# Patient Record
Sex: FEMALE | Race: WHITE | NOT HISPANIC OR LATINO | Employment: OTHER | URBAN - NONMETROPOLITAN AREA
[De-identification: names, ages, dates, MRNs, and addresses within clinical notes are randomized per-mention and may not be internally consistent; named-entity substitution may affect disease eponyms.]

---

## 2017-01-25 ENCOUNTER — OFFICE VISIT (OUTPATIENT)
Dept: CARDIOLOGY | Facility: CLINIC | Age: 62
End: 2017-01-25

## 2017-01-25 VITALS
DIASTOLIC BLOOD PRESSURE: 80 MMHG | HEIGHT: 64 IN | WEIGHT: 260 LBS | SYSTOLIC BLOOD PRESSURE: 112 MMHG | BODY MASS INDEX: 44.39 KG/M2

## 2017-01-25 DIAGNOSIS — R06.09 DYSPNEA ON EXERTION: ICD-10-CM

## 2017-01-25 DIAGNOSIS — I10 ESSENTIAL HYPERTENSION: ICD-10-CM

## 2017-01-25 DIAGNOSIS — E78.2 MIXED HYPERLIPIDEMIA: ICD-10-CM

## 2017-01-25 DIAGNOSIS — R60.0 BILATERAL EDEMA OF LOWER EXTREMITY: ICD-10-CM

## 2017-01-25 DIAGNOSIS — I25.10 CORONARY ARTERY DISEASE INVOLVING NATIVE CORONARY ARTERY OF NATIVE HEART WITHOUT ANGINA PECTORIS: Primary | ICD-10-CM

## 2017-01-25 PROCEDURE — 99214 OFFICE O/P EST MOD 30 MIN: CPT | Performed by: INTERNAL MEDICINE

## 2017-01-25 PROCEDURE — 93000 ELECTROCARDIOGRAM COMPLETE: CPT | Performed by: INTERNAL MEDICINE

## 2017-01-25 RX ORDER — CLONIDINE HYDROCHLORIDE 0.1 MG/1
0.1 TABLET ORAL 2 TIMES DAILY
COMMUNITY
End: 2017-07-14 | Stop reason: SDUPTHER

## 2017-01-25 RX ORDER — OLMESARTAN MEDOXOMIL 20 MG/1
40 TABLET ORAL DAILY
Qty: 30 TABLET | Refills: 6
Start: 2017-01-25 | End: 2022-03-18 | Stop reason: DRUGHIGH

## 2017-01-25 RX ORDER — FUROSEMIDE 20 MG/1
20 TABLET ORAL DAILY
Qty: 90 TABLET | Refills: 3 | Status: SHIPPED | OUTPATIENT
Start: 2017-01-25 | End: 2018-02-05 | Stop reason: SDUPTHER

## 2017-01-25 NOTE — PROGRESS NOTES
Aleksandra Joseph  61 y.o. female    01/25/2017  1. Coronary artery disease involving native coronary artery of native heart without angina pectoris    2. Essential hypertension    3. Mixed hyperlipidemia    4. Dyspnea on exertion    5. Bilateral edema of lower extremity        History of Present Illness    Mrs. Joseph is here for follow-up of her multiple issues.  Her predominant complaint is swelling in both lower extremities which has been present for the past couple weeks for which she she was evaluated by Dr. Rueda.  I understand that her HCTZ was stopped and she was started on Lasix 20 mg daily along with the spironolactone 25 mg daily and this seems to have helped and she has lost about 13 pounds in weight during the past week.  She does have NYHA class II dyspnea on exertion with no PND or orthopnea.  She did have one episode of chest pressure a few weeks prior and this seems to have resolved.  EKG today confirms sinus rhythm with ectopic atrial rhythm with no ST-T wave changes diagnostic of ischemia.  No signs of congestive heart failure were noted.  Her blood pressure was in the normal range.  She does have 1-2+ pitting edema in both lower extremities.  She denied any excessive salt intake and has been very compliant with her medications.        SUBJECTIVE    Allergies   Allergen Reactions   • Adhesive Tape      bandaids   • Naproxen      FACIAL SWELLING   • Tekturna [Aliskiren]      EXTREME EYE SWELLING   • Penicillins Rash         Past Medical History   Diagnosis Date   • Allergic conjunctivitis    • Borderline glaucoma    • Carpal tunnel syndrome    • Diverticulitis of colon    • Drusen of optic disc    • Hemorrhoids    • Hyperlipidemia    • Hypertension    • Ingrowing nail    • Vitreous detachment of right eye          Past Surgical History   Procedure Laterality Date   • Ankle surgery Right 08/14/1971     ORIF of trimalleolar fracture, right ankle   • Cardiac catheterization  08/06/1991     chest  pain etiology undetermined. Hypertension   • Carpal tunnel release  02/16/2012     Endoscopic carpal tunnel release. left wrist   • Cholecystectomy  09/26/2001     biliary colic   • Colonoscopy  11/25/1997     Mild diverticulosis of sigmoid colo w/ no evidence of diverticulitis. Internal hemorrhoidsHematochezia suspected related to internal hemorrhoids   • Dilatation and curettage  01/06/1984     dysfunctional uterine bleeding   • Nail bed removal/revision  01/18/1996     Negative for exercise induced ischemia at a level of 87% of the maximum predicted HR. Abnormal blood pressure response to exercise. Functional Class I   • Hand surgery  12/18/1971     excision of ganglion cyst, right palm.   • Knee arthroscopy  12/11/2012     Arthroscopy, medial & lateral meniscectomy, partial posteriorly w/ chondroplasty of the patella. Torn medial meniscus right knee   • Pap smear  05/02/1995     NORMAL   • Excision lesion  07/18/2002     Shave excision, right superior earlobe. Intradermal nevus, symptomatic, right superior earlobe   • Hysterectomy  11/20/1992     laparoscopically assisted vaginal hysterectomy & RSO. Pelvic pain, dyspareunia, menorrhagia, dysmenorrhea. Same plus? endometriosis of right ovary   • Coronary stent placement           Family History   Problem Relation Age of Onset   • Breast cancer Other    • Colon cancer Other    • Diabetes Other    • Heart disease Other    • Hypertension Other    • Thyroid disease Other          Social History     Social History   • Marital status:      Spouse name: N/A   • Number of children: N/A   • Years of education: N/A     Occupational History   • Not on file.     Social History Main Topics   • Smoking status: Never Smoker   • Smokeless tobacco: Never Used   • Alcohol use No   • Drug use: No   • Sexual activity: Defer     Other Topics Concern   • Not on file     Social History Narrative         Current Outpatient Prescriptions   Medication Sig Dispense Refill   •  aspirin 81 MG EC tablet Take 81 mg by mouth Daily.     • Bioflavonoid Products (VITAMIN C) chewable tablet Chew.     • Calcium Carbonate-Vitamin D (CALCIUM 500 + D PO) Take  by mouth.     • clonazePAM (KlonoPIN) 1 MG tablet Take 1 mg by mouth At Night As Needed for seizures.     • CloNIDine (CATAPRES) 0.1 MG tablet Take 0.1 mg by mouth 2 (Two) Times a Day.     • clopidogrel (PLAVIX) 75 MG tablet Take 75 mg by mouth Daily.     • Coenzyme Q10 (CO Q-10) 400 MG capsule Take  by mouth.     • dicyclomine (BENTYL) 10 MG capsule Take 10 mg by mouth As Needed.     • estrogens, conjugated, (PREMARIN) 0.625 MG tablet Take 0.625 mg by mouth Daily. Take daily for 21 days then do not take for 7 days.     • Glucosamine-Chondroitin (OSTEO BI-FLEX REGULAR STRENGTH PO) Take  by mouth.     • isosorbide dinitrate (ISORDIL) 30 MG tablet Take 30 mg by mouth 4 (Four) Times a Day.     • LABETALOL HCL PO Take 200 tablets by mouth 2 (Two) Times a Day.     • lansoprazole (PREVACID) 30 MG capsule Take 30 mg by mouth Daily.     • levothyroxine (SYNTHROID, LEVOTHROID) 100 MCG tablet Take 100 mcg by mouth Daily.     • minocycline (MINOCIN,DYNACIN) 100 MG capsule Take 100 mg by mouth Daily.     • Multiple Vitamins-Minerals (MULTIVITAMIN ADULT PO) Take  by mouth.     • nitroglycerin (NITROSTAT) 0.4 MG SL tablet 1 under the tongue as needed for angina, may repeat q5mins for up three doses 30 tablet 3   • olmesartan-hydrochlorothiazide (BENICAR HCT) 40-12.5 MG per tablet Take 1 tablet by mouth Daily. 90 tablet 3   • Omega-3 Fatty Acids (FISH OIL) 1000 MG capsule capsule Take  by mouth 2 (Two) Times a Day With Meals.     • rosuvastatin (CRESTOR) 10 MG tablet Take 10 mg by mouth Daily.     • spironolactone (ALDACTONE) 25 MG tablet Take 25 mg by mouth Daily.     • Sucralfate (CARAFATE PO) Take  by mouth.     • vitamin C (ASCORBIC ACID) 500 MG tablet Take 500 mg by mouth Daily.       No current facility-administered medications for this visit.   "        OBJECTIVE    Visit Vitals   • /80   • Ht 64\" (162.6 cm)   • Wt 260 lb (118 kg)   • BMI 44.63 kg/m2           Review of Systems     Constitutional:  Denies recent weight loss, weight gain, fever or chills, no change in exercise tolerance     HENT:  Denies any hearing loss, epistaxis, hoarseness, or difficulty speaking.     Eyes: Wears eyeglasses or contact lenses     Respiratory:  Dyspnea with exertion, no cough, wheezing, or hemoptysis.     Cardiovascular: Negative for palpations, syncope, or claudication.     Gastrointestinal:  Denies change in bowel habits, dyspepsia, ulcer disease, hematochezia, or melena.     Endocrine: Negative for cold intolerance, heat intolerance, polydipsia, polyphagia and polyuria. Denies any history of weight change, heat/cold intolerance, polydipsia, polyuria     Genitourinary: Negative.      Musculoskeletal: Denies any history of arthritic symptoms or back problems     Skin:  Denies any change in hair or nails, rashes, or skin lesions.     Allergic/Immunologic: Negative.  Negative for environmental allergies, food allergies and immunocompromised state.     Neurological:  Denies any history of recurrent headaches, strokes, TIA, or seizure disorder.     Hematological: Denies any food allergies, seasonal allergies, bleeding disorders, or lymphadenopathy.     Psychiatric/Behavioral: Denies any history of depression, substance abuse, or change in cognitive function.         Physical Exam     Constitutional: Cooperative, alert and oriented, well-developed, well-nourished, in no acute distress.     HENT:   Head: Normocephalic, normal hair patterns, no masses or tenderness.  Ears, Nose, and Throat: No gross abnormalities. No pallor or cyanosis. Dentition good.   Eyes: EOMS intact, PERRL, conjunctivae and lids unremarkable. Fundoscopic exam and visual fields not performed.   Neck: No palpable masses or adenopathy, no thyromegaly, no JVD, carotid pulses are full and equal " bilaterally and without  Bruits.     Cardiovascular: Regular rhythm, S1 and S2 normal, no S3 or S4. Apical impulse not displaced. No murmurs, gallops, or rubs detected.     Pulmonary/Chest: Chest: normal symmetry, no tenderness to palpation, normal respiratory excursion, no intercostal retraction, no use of accessory muscles.            Pulmonary: Normal breath sounds. No rales or ronchi.    Abdominal: Abdomen soft, bowel sounds normoactive, no masses, no hepatosplenomegaly, non-tender, no bruits.     Musculoskeletal: No deformities, clubbing, cyanosis, erythema, 1-2+ edema observed in lower extremities.There are no spinal abnormalities noted. Normal muscle strength and tone. Pulses full and equal in all extremities, no bruits auscultated.     Neurological: No gross motor or sensory deficits noted, affect appropriate, oriented to time, person, place.     Skin: Warm and dry to the touch, no apparent skin lesions or masses noted.     Psychiatric: She has a normal mood and affect. Her behavior is normal. Judgment and thought content normal.           ECG 12 Lead  Date/Time: 1/25/2017 12:36 PM  Performed by: VIRI RAMIREZ  Authorized by: VIRI RAMIREZ   Comparison: not compared with previous ECG   Rate: normal  QRS axis: normal  Comments: EKG showed ectopic atrial rhythm heart rate of 66 bpm.  Nonspecific ST-T changes were noted.              No results found for: WBC, HGB, HCT, MCV, PLT  No results found for: GLUCOSE, BUN, CREATININE, EGFRIFNONA, EGFRIFAFRI, BCR, CO2, CALCIUM, PROTENTOTREF, ALBUMIN, LABIL2, AST, ALT  No results found for: CHOL  No results found for: TRIG  No results found for: HDL  No results found for: LDLCALC  No results found for: LDL  No results found for: HDLLDLRATIO  No components found for: CHOLHDL  No results found for: HGBA1C  No results found for: TSH, W4ZFZZC, S1PQRDU, THYROIDAB        ASSESSMENT AND PLAN    Ms. Joseph has presented with dyspnea and edema which is  clearly multifactorial.  No signs of congestive heart failure were noted.  The suspicion for ongoing ischemia is low.  I agree with continuing Lasix and Aldactone at the present time and antihypertensive therapy with the labetalol, Benicar and clonidine may be continued.  An echocardiogram to assess left ventricular and valvular function and right heart pressures is being arranged.  Antiplatelet therapy with aspirin and Plavix have been continued.  I suspect that venous stasis and side effects of medication is contributing to it edema.  Aleksandra was seen today for leg swelling.    Diagnoses and all orders for this visit:    Coronary artery disease involving native coronary artery of native heart without angina pectoris  -     Adult Transthoracic Echo Complete; Future    Essential hypertension  -     Adult Transthoracic Echo Complete; Future    Mixed hyperlipidemia  -     Adult Transthoracic Echo Complete; Future    Dyspnea on exertion  -     Adult Transthoracic Echo Complete; Future    Bilateral edema of lower extremity  -     Adult Transthoracic Echo Complete; Future        Joshua Ernandez MD  1/25/2017  12:30 PM

## 2017-01-25 NOTE — MR AVS SNAPSHOT
Aleksandra Joseph   1/25/2017 12:00 PM   Office Visit    Dept Phone:  [phone]   Encounter #:  [id number]    Provider:  Joshua Ernandez MD   Department:  [facility] CARDIOLOGY                Your Full Care Plan              Today's Medication Changes          These changes are accurate as of: 1/25/17 12:34 PM.  If you have any questions, ask your nurse or doctor.               Stop taking medication(s)listed here:     latanoprost 0.005 % ophthalmic solution   Commonly known as:  XALATAN   Stopped by:  Joshua Ernandez MD                      Your Updated Medication List          This list is accurate as of: 1/25/17 12:34 PM.  Always use your most recent med list.                aspirin 81 MG EC tablet       CALCIUM 500 + D PO       CARAFATE PO       clonazePAM 1 MG tablet   Commonly known as:  KlonoPIN       CloNIDine 0.1 MG tablet   Commonly known as:  CATAPRES       clopidogrel 75 MG tablet   Commonly known as:  PLAVIX       Co Q-10 400 MG capsule       dicyclomine 10 MG capsule   Commonly known as:  BENTYL       estrogens (conjugated) 0.625 MG tablet   Commonly known as:  PREMARIN       fish oil 1000 MG capsule capsule       isosorbide dinitrate 30 MG tablet   Commonly known as:  ISORDIL       LABETALOL HCL PO       lansoprazole 30 MG capsule   Commonly known as:  PREVACID       levothyroxine 100 MCG tablet   Commonly known as:  SYNTHROID, LEVOTHROID       minocycline 100 MG capsule   Commonly known as:  MINOCIN,DYNACIN       MULTIVITAMIN ADULT PO       nitroglycerin 0.4 MG SL tablet   Commonly known as:  NITROSTAT   1 under the tongue as needed for angina, may repeat q5mins for up three doses       olmesartan-hydrochlorothiazide 40-12.5 MG per tablet   Commonly known as:  BENICAR HCT   Take 1 tablet by mouth Daily.       OSTEO BI-FLEX REGULAR STRENGTH PO       rosuvastatin 10 MG tablet   Commonly known as:  CRESTOR       spironolactone 25 MG  tablet   Commonly known as:  ALDACTONE       vitamin C 500 MG tablet   Commonly known as:  ASCORBIC ACID       Vitamin C chewable tablet               You Were Diagnosed With        Codes Comments    Coronary artery disease involving native coronary artery of native heart without angina pectoris    -  Primary ICD-10-CM: I25.10  ICD-9-CM: 414.01     Essential hypertension     ICD-10-CM: I10  ICD-9-CM: 401.9     Mixed hyperlipidemia     ICD-10-CM: E78.2  ICD-9-CM: 272.2     Dyspnea on exertion     ICD-10-CM: R06.09  ICD-9-CM: 786.09     Bilateral edema of lower extremity     ICD-10-CM: R60.0  ICD-9-CM: 782.3       Instructions     None    Patient Instructions History      Upcoming Appointments     Visit Type Date Time Department    OFFICE VISIT 1/25/2017 12:00 PM [facility] HEART [facility] ECHO 2D COMPLETE [place] 2/1/2017 11:30 AM [facility] HEART [facility]    OFFICE VISIT 5/4/2017  1:15 PM [facility]    VISUAL FIELD 6/21/2017  1:00 PM [facility] OPHTHALMOLOGY [facility]    OV WITH VISUAL FIELD 6/21/2017  1:30 PM [facility] OPHTHALMOLOGY [facility]    OFFICE VISIT 7/25/2017 11:30 AM [facility]      MyChart Signup     Our records indicate that you have declined [facility] sign[facility]. If you would like to sign up for [facility], please email [email or web address] or call [phone] to obtain an activation code.             Other Info from Your Visit           Your Appointments     Feb 01, 2017 11:30 AM CST   [facility]ARD[facility] CARDIOLOGY (--)    [address]  [facility] [address] [phone]           Bring your insurance cards with you. Wear comfortable clothing and shoes.            May 04, 2017  1:15 PM CDT   Office Visit with Joshua Ernandez MD   [facility] CARDIOLOGY (--)    [address]  [facility] [address] [phone]           Arrive 15 minutes prior to appointment.          "   Jun 21, 2017  1:00 PM CDT   Visual Field with [facility] OPHTHALMOLOGY [facility] OPHTHALMOLOGY (--)    [address] [place] [address] [address]  [address] [facility] [address] [phone]            Jun 21, 2017  1:30 PM CDT   office visit with visual fields with Jacinto Mckee MD   [facility] OPHTHALMOLOGY (--)    [address] [place] [address] [address]  [address] [place] [address] [phone]            Jul 25, 2017 11:30 AM CDT   Office Visit with Joshua Ernandez MD   [facility] CARDIOLOGY (--)    [address] [facility] [address]  [facility] [address] [phone]           Arrive 15 minutes prior to appointment.              Allergies     Adhesive Tape      bandaids    Naproxen      FACIAL SWELLING    Tekturna [Aliskiren]      EXTREME EYE SWELLING    Penicillins  Rash      Reason for Visit     Leg Swelling           Vital Signs     Blood Pressure Height Weight Body Mass Index Smoking Status       112/80 64\" (162.6 cm) 260 lb (118 kg) 44.63 kg/m2 Never Smoker       Problems and Diagnoses Noted     Coronary artery disease involving native coronary artery of native heart without angina pectoris    Breathlessness on exertion    Fluid retention in legs    High blood pressure    Mixed hyperlipidemia        "

## 2017-02-07 ENCOUNTER — DOCUMENTATION (OUTPATIENT)
Dept: CARDIOLOGY | Facility: CLINIC | Age: 62
End: 2017-02-07

## 2017-02-20 RX ORDER — SPIRONOLACTONE 25 MG/1
25 TABLET ORAL DAILY
Qty: 90 TABLET | Refills: 2 | Status: SHIPPED | OUTPATIENT
Start: 2017-02-20 | End: 2017-11-20 | Stop reason: SDUPTHER

## 2017-02-20 NOTE — TELEPHONE ENCOUNTER
Refill Request came in by fax from   [facility]/pharmacy #[id number] - [place], [place] - [address] [place] [address] - [phone]  - [phone] [address] [address]  Phone: [phone] Fax: [phone]   Refill sent to pharmacy via e-scribe.

## 2017-02-22 DIAGNOSIS — H40.10X0 OPEN-ANGLE GLAUCOMA OF BOTH EYES, UNSPECIFIED GLAUCOMA STAGE, UNSPECIFIED OPEN-ANGLE GLAUCOMA TYPE: Primary | ICD-10-CM

## 2017-02-22 RX ORDER — LATANOPROST 50 UG/ML
1 SOLUTION/ DROPS OPHTHALMIC NIGHTLY
Qty: 7.5 ML | Refills: 3 | Status: SHIPPED | OUTPATIENT
Start: 2017-02-22 | End: 2017-03-08 | Stop reason: SDUPTHER

## 2017-03-08 DIAGNOSIS — H40.10X0 OPEN-ANGLE GLAUCOMA OF BOTH EYES, UNSPECIFIED GLAUCOMA STAGE, UNSPECIFIED OPEN-ANGLE GLAUCOMA TYPE: ICD-10-CM

## 2017-03-08 RX ORDER — LATANOPROST 50 UG/ML
1 SOLUTION/ DROPS OPHTHALMIC NIGHTLY
Qty: 7.5 ML | Refills: 3 | Status: SHIPPED | OUTPATIENT
Start: 2017-03-08

## 2017-04-17 RX ORDER — ROSUVASTATIN CALCIUM 10 MG/1
10 TABLET, COATED ORAL DAILY
Qty: 90 TABLET | Refills: 2 | Status: SHIPPED | OUTPATIENT
Start: 2017-04-17 | End: 2018-01-03 | Stop reason: SDUPTHER

## 2017-04-17 NOTE — TELEPHONE ENCOUNTER
Refill Request came in by fax from   [facility]/pharmacy #[id number] - [place], [place] - [address] [place] [address] - [phone]  - [phone] [address] [address]  Phone: [phone] Fax: [phone]   Refill sent to pharmacy via e-scribe.

## 2017-07-14 RX ORDER — CLONIDINE HYDROCHLORIDE 0.1 MG/1
0.1 TABLET ORAL 2 TIMES DAILY
Qty: 180 TABLET | Refills: 2 | Status: SHIPPED | OUTPATIENT
Start: 2017-07-14 | End: 2018-04-17 | Stop reason: SDUPTHER

## 2017-07-14 RX ORDER — ISOSORBIDE MONONITRATE 30 MG/1
30 TABLET, EXTENDED RELEASE ORAL DAILY
COMMUNITY
End: 2017-07-17 | Stop reason: SDUPTHER

## 2017-07-14 RX ORDER — LABETALOL 200 MG/1
200 TABLET, FILM COATED ORAL 2 TIMES DAILY
Qty: 180 TABLET | Refills: 2 | Status: SHIPPED | OUTPATIENT
Start: 2017-07-14 | End: 2018-04-02 | Stop reason: SDUPTHER

## 2017-07-14 NOTE — TELEPHONE ENCOUNTER
Refill Request came in by fax from   [facility]/pharmacy #[id number] - [place], [place] - [address] [place] [address] - [phone]  - [phone] [address] [address]  Phone: [phone] Fax: [phone]   Refill sent to pharmacy via e-scribe.

## 2017-07-17 RX ORDER — ISOSORBIDE MONONITRATE 30 MG/1
30 TABLET, EXTENDED RELEASE ORAL DAILY
Qty: 90 TABLET | Refills: 1 | Status: SHIPPED | OUTPATIENT
Start: 2017-07-17 | End: 2018-01-03 | Stop reason: SDUPTHER

## 2017-08-01 ENCOUNTER — OFFICE VISIT (OUTPATIENT)
Dept: CARDIOLOGY | Facility: CLINIC | Age: 62
End: 2017-08-01

## 2017-08-01 VITALS
SYSTOLIC BLOOD PRESSURE: 110 MMHG | DIASTOLIC BLOOD PRESSURE: 75 MMHG | BODY MASS INDEX: 42.51 KG/M2 | HEIGHT: 64 IN | HEART RATE: 78 BPM | WEIGHT: 249 LBS

## 2017-08-01 DIAGNOSIS — E78.2 MIXED HYPERLIPIDEMIA: ICD-10-CM

## 2017-08-01 DIAGNOSIS — I10 ESSENTIAL HYPERTENSION: ICD-10-CM

## 2017-08-01 DIAGNOSIS — I25.10 CORONARY ARTERY DISEASE INVOLVING NATIVE CORONARY ARTERY OF NATIVE HEART WITHOUT ANGINA PECTORIS: Primary | ICD-10-CM

## 2017-08-01 DIAGNOSIS — R06.09 DYSPNEA ON EXERTION: ICD-10-CM

## 2017-08-01 PROCEDURE — 99213 OFFICE O/P EST LOW 20 MIN: CPT | Performed by: INTERNAL MEDICINE

## 2017-08-01 RX ORDER — ESTRADIOL 1 MG/1
1 TABLET ORAL DAILY
COMMUNITY
End: 2018-08-23 | Stop reason: SDUPTHER

## 2017-08-01 NOTE — PROGRESS NOTES
Aleksandra Joseph  62 y.o. female    08/01/2017  1. Coronary artery disease involving native coronary artery of native heart without angina pectoris    2. Essential hypertension    3. Mixed hyperlipidemia    4. Dyspnea on exertion        History of Present Illness    Mrs. Joseph is here for follow-up of her above stated problems.  She denied any chest pain, shortness of breath, palpitation, dizziness or syncope.  She's been compliant with her medications.  Her blood pressure was in the normal range.  She is coming up for a physical by Dr. Rueda and blood work will be performed at that time.  Her lipid profiles within the normal range in October last year.        SUBJECTIVE    Allergies   Allergen Reactions   • Adhesive Tape      bandaids   • Naproxen      FACIAL SWELLING   • Tekturna [Aliskiren]      EXTREME EYE SWELLING   • Penicillins Rash         Past Medical History:   Diagnosis Date   • Allergic conjunctivitis    • Borderline glaucoma    • Carpal tunnel syndrome    • Diverticulitis of colon    • Drusen of optic disc    • Hemorrhoids    • Hyperlipidemia    • Hypertension    • Ingrowing nail    • Vitreous detachment of right eye          Past Surgical History:   Procedure Laterality Date   • ANKLE SURGERY Right 08/14/1971    ORIF of trimalleolar fracture, right ankle   • CARDIAC CATHETERIZATION  08/06/1991    chest pain etiology undetermined. Hypertension   • CARPAL TUNNEL RELEASE  02/16/2012    Endoscopic carpal tunnel release. left wrist   • CHOLECYSTECTOMY  09/26/2001    biliary colic   • COLONOSCOPY  11/25/1997    Mild diverticulosis of sigmoid colo w/ no evidence of diverticulitis. Internal hemorrhoidsHematochezia suspected related to internal hemorrhoids   • CORONARY STENT PLACEMENT     • DILATATION AND CURETTAGE  01/06/1984    dysfunctional uterine bleeding   • EXCISION LESION  07/18/2002    Shave excision, right superior earlobe. Intradermal nevus, symptomatic, right superior earlobe   • HAND SURGERY   12/18/1971    excision of ganglion cyst, right palm.   • HYSTERECTOMY  11/20/1992    laparoscopically assisted vaginal hysterectomy & RSO. Pelvic pain, dyspareunia, menorrhagia, dysmenorrhea. Same plus? endometriosis of right ovary   • KNEE ARTHROSCOPY  12/11/2012    Arthroscopy, medial & lateral meniscectomy, partial posteriorly w/ chondroplasty of the patella. Torn medial meniscus right knee   • NAIL BED REMOVAL/REVISION  01/18/1996    Negative for exercise induced ischemia at a level of 87% of the maximum predicted HR. Abnormal blood pressure response to exercise. Functional Class I   • PAP SMEAR  05/02/1995    NORMAL   • REPLACEMENT TOTAL KNEE Left          Family History   Problem Relation Age of Onset   • Breast cancer Other    • Colon cancer Other    • Diabetes Other    • Heart disease Other    • Hypertension Other    • Thyroid disease Other          Social History     Social History   • Marital status:      Spouse name: N/A   • Number of children: N/A   • Years of education: N/A     Occupational History   • Not on file.     Social History Main Topics   • Smoking status: Never Smoker   • Smokeless tobacco: Never Used   • Alcohol use No   • Drug use: No   • Sexual activity: Defer     Other Topics Concern   • Not on file     Social History Narrative         Current Outpatient Prescriptions   Medication Sig Dispense Refill   • aspirin 81 MG EC tablet Take 81 mg by mouth Daily.     • Calcium Carbonate-Vitamin D (CALCIUM 500 + D PO) Take  by mouth.     • clonazePAM (KlonoPIN) 1 MG tablet Take 1 mg by mouth At Night As Needed for seizures.     • CloNIDine (CATAPRES) 0.1 MG tablet Take 1 tablet by mouth 2 (Two) Times a Day. 180 tablet 2   • clopidogrel (PLAVIX) 75 MG tablet Take 75 mg by mouth Daily.     • Coenzyme Q10 (CO Q-10) 400 MG capsule Take  by mouth.     • dicyclomine (BENTYL) 10 MG capsule Take 10 mg by mouth As Needed.     • estradiol (ESTRACE) 1 MG tablet Take 1 mg by mouth Daily.     •  "furosemide (LASIX) 20 MG tablet Take 1 tablet by mouth Daily. 90 tablet 3   • Glucosamine-Chondroitin (OSTEO BI-FLEX REGULAR STRENGTH PO) Take  by mouth.     • isosorbide mononitrate (IMDUR) 30 MG 24 hr tablet Take 1 tablet by mouth Daily. 90 tablet 1   • labetalol (NORMODYNE) 200 MG tablet Take 1 tablet by mouth 2 (Two) Times a Day. 180 tablet 2   • lansoprazole (PREVACID) 30 MG capsule Take 30 mg by mouth Daily.     • latanoprost (XALATAN) 0.005 % ophthalmic solution Administer 1 drop to both eyes Every Night. 7.5 mL 3   • levothyroxine (SYNTHROID, LEVOTHROID) 100 MCG tablet Take 100 mcg by mouth Daily.     • minocycline (MINOCIN,DYNACIN) 100 MG capsule Take 100 mg by mouth Daily.     • Multiple Vitamins-Minerals (MULTIVITAMIN ADULT PO) Take  by mouth.     • nitroglycerin (NITROSTAT) 0.4 MG SL tablet 1 under the tongue as needed for angina, may repeat q5mins for up three doses 30 tablet 3   • olmesartan (BENICAR) 20 MG tablet Take 2 tablets by mouth Daily. 30 tablet 6   • Omega-3 Fatty Acids (FISH OIL) 1000 MG capsule capsule Take  by mouth 2 (Two) Times a Day With Meals.     • rosuvastatin (CRESTOR) 10 MG tablet Take 1 tablet by mouth Daily. 90 tablet 2   • spironolactone (ALDACTONE) 25 MG tablet Take 1 tablet by mouth Daily. 90 tablet 2   • Sucralfate (CARAFATE PO) Take  by mouth.     • vitamin C (ASCORBIC ACID) 500 MG tablet Take 500 mg by mouth Daily.       No current facility-administered medications for this visit.          OBJECTIVE    /75  Pulse 78  Ht 64\" (162.6 cm)  Wt 249 lb (113 kg)  BMI 42.74 kg/m2        Review of Systems     Constitutional:  weight loss, no fever or chills, no change in exercise tolerance     HENT:  Denies any hearing loss, epistaxis, hoarseness, or difficulty speaking.     Eyes: Wears eyeglasses or contact lenses     Respiratory:  Denies dyspnea with exertion,no cough, wheezing, or hemoptysis.     Cardiovascular: Negative for palpations, chest pain, orthopnea, PND, " peripheral edema, syncope, or claudication.     Gastrointestinal:  Denies change in bowel habits, dyspepsia, ulcer disease, hematochezia, or melena.     Endocrine: Negative for cold intolerance, heat intolerance, polydipsia, polyphagia and polyuria.     Genitourinary: Negative.      Musculoskeletal: DJD    Skin:  Denies any change in hair or nails, rashes, or skin lesions.     Allergic/Immunologic: Negative.  Negative for environmental allergies, food allergies and immunocompromised state.     Neurological:  Denies any history of recurrent headaches, strokes, TIA, or seizure disorder.     Hematological: Denies any food allergies, seasonal allergies, bleeding disorders, or lymphadenopathy.     Psychiatric/Behavioral: Denies any history of depression, substance abuse, or change in cognitive function.         Physical Exam     Constitutional: Cooperative, alert and oriented, well-developed,  in no acute distress.     HENT:   Head: Normocephalic, normal hair patterns, no masses or tenderness.  Ears, Nose, and Throat: No gross abnormalities. No pallor or cyanosis.  Eyes: EOMS intact, PERRL, conjunctivae and lids unremarkable. Fundoscopic exam and visual fields not performed.   Neck: No palpable masses or adenopathy, no thyromegaly, no JVD, carotid pulses are full and equal bilaterally and without  Bruits.     Cardiovascular: Regular rhythm, S1 and S2 normal, no S3 or S4.  No murmurs, gallops, or rubs detected.     Pulmonary/Chest: Chest: normal symmetry, no tenderness to palpation, normal respiratory excursion, no intercostal retraction, no use of accessory muscles.            Pulmonary: Normal breath sounds. No rales or ronchi.    Abdominal: Abdomen soft, bowel sounds normoactive, no masses, no hepatosplenomegaly, non-tender, no bruits.     Musculoskeletal: No deformities, clubbing, cyanosis, erythema, or edema observed.     Neurological: No gross motor or sensory deficits noted, affect appropriate, oriented to time,  person, place.     Skin: Warm and dry to the touch, no apparent skin lesions or masses noted.     Psychiatric: She has a normal mood and affect. Her behavior is normal. Judgment and thought content normal.         Procedures      No results found for: WBC, HGB, HCT, MCV, PLT  No results found for: GLUCOSE, BUN, CREATININE, EGFRIFNONA, EGFRIFAFRI, BCR, CO2, CALCIUM, PROTENTOTREF, ALBUMIN, LABIL2, AST, ALT  No results found for: CHOL  No results found for: TRIG  No results found for: HDL  No results found for: LDLCALC  No results found for: LDL  No results found for: HDLLDLRATIO  No components found for: CHOLHDL  No results found for: HGBA1C  No results found for: TSH, I7VGFBO, F0DCUIQ, THYROIDAB        ASSESSMENT AND PLAN    Mrs. Joseph is stable with no evidence of progression of coronary artery disease or congestive heart failure.  Antiplatelet therapy with aspirin and Plavix, antihypertensive therapy with clonidine, labetalol, Benicar, Aldactone and antianginal therapy with isosorbide mononitrate and lipid-lowering therapy with Crestor has been continued.  Diagnoses and all orders for this visit:    Coronary artery disease involving native coronary artery of native heart without angina pectoris    Essential hypertension    Mixed hyperlipidemia    Dyspnea on exertion        Joshua Ernandez MD  8/1/2017  11:10 AM

## 2017-08-02 RX ORDER — CLOPIDOGREL BISULFATE 75 MG/1
75 TABLET ORAL DAILY
Qty: 30 TABLET | Refills: 6 | Status: SHIPPED | OUTPATIENT
Start: 2017-08-02 | End: 2018-04-02 | Stop reason: SDUPTHER

## 2017-11-20 RX ORDER — SPIRONOLACTONE 25 MG/1
25 TABLET ORAL DAILY
Qty: 90 TABLET | Refills: 2 | Status: SHIPPED | OUTPATIENT
Start: 2017-11-20

## 2017-11-20 NOTE — TELEPHONE ENCOUNTER
Refill Request came in by fax from   [facility]/pharmacy #[id number] - [place], [place] - [address] [place] [address] - [phone]  - [phone] [address] [address]  Phone: [phone] Fax: [phone]   Refill sent to pharmacy via e-scribe.

## 2018-01-03 RX ORDER — ISOSORBIDE MONONITRATE 30 MG/1
TABLET, EXTENDED RELEASE ORAL
Qty: 90 TABLET | Refills: 2 | Status: SHIPPED | OUTPATIENT
Start: 2018-01-03 | End: 2018-09-04 | Stop reason: SDUPTHER

## 2018-01-03 RX ORDER — ROSUVASTATIN CALCIUM 10 MG/1
TABLET, COATED ORAL
Qty: 90 TABLET | Refills: 2 | Status: SHIPPED | OUTPATIENT
Start: 2018-01-03 | End: 2018-09-04 | Stop reason: SDUPTHER

## 2018-02-05 RX ORDER — FUROSEMIDE 20 MG/1
TABLET ORAL
Qty: 90 TABLET | Refills: 2 | Status: SHIPPED | OUTPATIENT
Start: 2018-02-05 | End: 2018-10-06 | Stop reason: SDUPTHER

## 2018-04-02 RX ORDER — CLOPIDOGREL BISULFATE 75 MG/1
75 TABLET ORAL DAILY
Qty: 30 TABLET | Refills: 6 | Status: SHIPPED | OUTPATIENT
Start: 2018-04-02 | End: 2018-04-25 | Stop reason: SDUPTHER

## 2018-04-02 RX ORDER — LABETALOL 200 MG/1
200 TABLET, FILM COATED ORAL EVERY 12 HOURS SCHEDULED
Qty: 180 TABLET | Refills: 2 | Status: SHIPPED | OUTPATIENT
Start: 2018-04-02 | End: 2018-11-19 | Stop reason: SDUPTHER

## 2018-04-02 NOTE — TELEPHONE ENCOUNTER
Refill Request came in by fax from   [facility]/pharmacy #[id number] - [place], [place] - [address] [place] [address] - [phone]  - [phone] [address] [address]  Phone: [phone] Fax: [phone]   Refill sent to pharmacy via e-scribe.

## 2018-04-02 NOTE — TELEPHONE ENCOUNTER
Refill Request came in by fax from   [facility]/pharmacy #[id number] - [place], [place] - [address] [place] [address] - [phone]  - [phone] [address] [address]  Phone: [phone] Fax: [phone]   Refill sent to pharmacy via e-scribe.

## 2018-04-17 RX ORDER — CLONIDINE HYDROCHLORIDE 0.1 MG/1
0.1 TABLET ORAL EVERY 12 HOURS SCHEDULED
Qty: 180 TABLET | Refills: 2 | Status: SHIPPED | OUTPATIENT
Start: 2018-04-17 | End: 2018-12-18 | Stop reason: SDUPTHER

## 2018-04-17 NOTE — TELEPHONE ENCOUNTER
Refill Request came in by fax from   [facility]/pharmacy #[id number] - [place], [place] - [address] [place] [address] - [phone]  - [phone] [address] [address]  Phone: [phone] Fax: [phone]   Refill sent to pharmacy via e-scribe.

## 2018-04-25 RX ORDER — CLOPIDOGREL BISULFATE 75 MG/1
75 TABLET ORAL DAILY
Qty: 90 TABLET | Refills: 2 | Status: SHIPPED | OUTPATIENT
Start: 2018-04-25 | End: 2019-02-06 | Stop reason: SDUPTHER

## 2018-08-01 ENCOUNTER — OFFICE VISIT (OUTPATIENT)
Dept: CARDIOLOGY | Facility: CLINIC | Age: 63
End: 2018-08-01

## 2018-08-01 VITALS
SYSTOLIC BLOOD PRESSURE: 122 MMHG | OXYGEN SATURATION: 98 % | HEIGHT: 64 IN | DIASTOLIC BLOOD PRESSURE: 62 MMHG | HEART RATE: 63 BPM | BODY MASS INDEX: 45.55 KG/M2 | WEIGHT: 266.8 LBS

## 2018-08-01 DIAGNOSIS — I25.10 CORONARY ARTERY DISEASE INVOLVING NATIVE CORONARY ARTERY OF NATIVE HEART WITHOUT ANGINA PECTORIS: ICD-10-CM

## 2018-08-01 DIAGNOSIS — E78.2 MIXED HYPERLIPIDEMIA: Primary | ICD-10-CM

## 2018-08-01 DIAGNOSIS — I10 ESSENTIAL HYPERTENSION: ICD-10-CM

## 2018-08-01 PROCEDURE — 99214 OFFICE O/P EST MOD 30 MIN: CPT | Performed by: INTERNAL MEDICINE

## 2018-08-01 NOTE — PROGRESS NOTES
Aleksandra Joseph  63 y.o. female    08/01/2018  1. Mixed hyperlipidemia    2. Essential hypertension    3. Coronary artery disease involving native coronary artery of native heart without angina pectoris        History of Present Illness    Mrs. Joseph is here for follow-up of her above stated problems. About a week ago she experienced skipped beats for a period of up to 15 minutes when she laid down in bed at night.  It lasted for about 15 minutes and it abated.  She indicates that she checked her pulse and it was around 80 bpm.  She was under a lot of stress taking care of her father who is unwell.  She has not had any reoccurrence of the symptoms.  Some degree of burning in the upper half of the body including arms.  Clinical exam today was unremarkable with no evidence of congestive heart failure.  Blood pressure was in the normal range.  She underwent PCI to right coronary artery in 2011 and has done well with regards to his CAD.    SUBJECTIVE    Allergies   Allergen Reactions   • Adhesive Tape      bandaids   • Naproxen      FACIAL SWELLING   • Tekturna [Aliskiren]      EXTREME EYE SWELLING   • Penicillins Rash         Past Medical History:   Diagnosis Date   • Allergic conjunctivitis    • Borderline glaucoma    • Carpal tunnel syndrome    • Diverticulitis of colon    • Drusen of optic disc    • Hemorrhoids    • Hyperlipidemia    • Hypertension    • Ingrowing nail    • Vitreous detachment of right eye          Past Surgical History:   Procedure Laterality Date   • ANKLE SURGERY Right 08/14/1971    ORIF of trimalleolar fracture, right ankle   • CARDIAC CATHETERIZATION  08/06/1991    chest pain etiology undetermined. Hypertension   • CARPAL TUNNEL RELEASE  02/16/2012    Endoscopic carpal tunnel release. left wrist   • CHOLECYSTECTOMY  09/26/2001    biliary colic   • COLONOSCOPY  11/25/1997    Mild diverticulosis of sigmoid colo w/ no evidence of diverticulitis. Internal hemorrhoidsHematochezia suspected  related to internal hemorrhoids   • CORONARY STENT PLACEMENT     • DILATATION AND CURETTAGE  01/06/1984    dysfunctional uterine bleeding   • EXCISION LESION  07/18/2002    Shave excision, right superior earlobe. Intradermal nevus, symptomatic, right superior earlobe   • HAND SURGERY  12/18/1971    excision of ganglion cyst, right palm.   • HYSTERECTOMY  11/20/1992    laparoscopically assisted vaginal hysterectomy & RSO. Pelvic pain, dyspareunia, menorrhagia, dysmenorrhea. Same plus? endometriosis of right ovary   • KNEE ARTHROSCOPY  12/11/2012    Arthroscopy, medial & lateral meniscectomy, partial posteriorly w/ chondroplasty of the patella. Torn medial meniscus right knee   • NAIL BED REMOVAL/REVISION  01/18/1996    Negative for exercise induced ischemia at a level of 87% of the maximum predicted HR. Abnormal blood pressure response to exercise. Functional Class I   • PAP SMEAR  05/02/1995    NORMAL   • REPLACEMENT TOTAL KNEE Left          Family History   Problem Relation Age of Onset   • Breast cancer Other    • Colon cancer Other    • Diabetes Other    • Heart disease Other    • Hypertension Other    • Thyroid disease Other          Social History     Social History   • Marital status:      Spouse name: N/A   • Number of children: N/A   • Years of education: N/A     Occupational History   • Not on file.     Social History Main Topics   • Smoking status: Never Smoker   • Smokeless tobacco: Never Used   • Alcohol use No   • Drug use: No   • Sexual activity: Defer     Other Topics Concern   • Not on file     Social History Narrative   • No narrative on file         Current Outpatient Prescriptions   Medication Sig Dispense Refill   • aspirin 81 MG EC tablet Take 81 mg by mouth Daily.     • Calcium Carbonate-Vitamin D (CALCIUM 500 + D PO) Take  by mouth.     • clonazePAM (KlonoPIN) 1 MG tablet Take 1 mg by mouth At Night As Needed for seizures.     • CloNIDine (CATAPRES) 0.1 MG tablet Take 1 tablet by mouth  "Every 12 (Twelve) Hours. 180 tablet 2   • clopidogrel (PLAVIX) 75 MG tablet Take 1 tablet by mouth Daily. 90 tablet 2   • Coenzyme Q10 (CO Q-10) 400 MG capsule Take  by mouth.     • dicyclomine (BENTYL) 10 MG capsule Take 10 mg by mouth As Needed.     • estradiol (ESTRACE) 1 MG tablet Take 1 mg by mouth Daily.     • furosemide (LASIX) 20 MG tablet TAKE 1 TABLET BY MOUTH DAILY. 90 tablet 2   • Glucosamine-Chondroitin (OSTEO BI-FLEX REGULAR STRENGTH PO) Take  by mouth.     • isosorbide mononitrate (IMDUR) 30 MG 24 hr tablet TAKE 1 TABLET BY MOUTH DAILY. 90 tablet 2   • labetalol (NORMODYNE) 200 MG tablet Take 1 tablet by mouth Every 12 (Twelve) Hours. 180 tablet 2   • lansoprazole (PREVACID) 30 MG capsule Take 30 mg by mouth Daily.     • latanoprost (XALATAN) 0.005 % ophthalmic solution Administer 1 drop to both eyes Every Night. 7.5 mL 3   • levothyroxine (SYNTHROID, LEVOTHROID) 100 MCG tablet Take 100 mcg by mouth Daily.     • minocycline (MINOCIN,DYNACIN) 100 MG capsule Take 100 mg by mouth Daily.     • Multiple Vitamins-Minerals (MULTIVITAMIN ADULT PO) Take  by mouth.     • nitroglycerin (NITROSTAT) 0.4 MG SL tablet 1 under the tongue as needed for angina, may repeat q5mins for up three doses 30 tablet 3   • olmesartan (BENICAR) 20 MG tablet Take 2 tablets by mouth Daily. 30 tablet 6   • Omega-3 Fatty Acids (FISH OIL) 1000 MG capsule capsule Take  by mouth 2 (Two) Times a Day With Meals.     • rosuvastatin (CRESTOR) 10 MG tablet TAKE 1 TABLET BY MOUTH DAILY. 90 tablet 2   • spironolactone (ALDACTONE) 25 MG tablet Take 1 tablet by mouth Daily. 90 tablet 2   • Sucralfate (CARAFATE PO) Take  by mouth.     • vitamin C (ASCORBIC ACID) 500 MG tablet Take 500 mg by mouth Daily.       No current facility-administered medications for this visit.          OBJECTIVE    /62   Pulse 63   Ht 162.6 cm (64.02\")   Wt 121 kg (266 lb 12.8 oz)   SpO2 98%   BMI 45.77 kg/m²         Review of Systems     Constitutional:  " Denies recent weight loss, weight gain, fever or chills, no change in exercise tolerance     HENT:  Denies any hearing loss, epistaxis, hoarseness, or difficulty speaking.     Eyes: Wears eyeglasses or contact lenses     Respiratory:  Denies dyspnea with exertion,no cough, wheezing, or hemoptysis.     Cardiovascular:See history of present illness    Gastrointestinal:  Denies change in bowel habits, dyspepsia, ulcer disease, hematochezia, or melena.     Endocrine: Negative for cold intolerance, heat intolerance, polydipsia, polyphagia and polyuria.  History of hypothyroidism    Genitourinary: Negative.      Musculoskeletal: Denies any history of arthritic symptoms or back problems     Skin:  Denies any change in hair or nails, rashes, or skin lesions.     Allergic/Immunologic: Negative.  Negative for environmental allergies, food allergies and immunocompromised state.     Neurological:  Denies any history of recurrent headaches, strokes, TIA, or seizure disorder.     Hematological: Denies any food allergies, seasonal allergies, bleeding disorders, or lymphadenopathy.     Psychiatric/Behavioral: Denies any history of depression, substance abuse, or change in cognitive function.         Physical Exam     Constitutional: Cooperative, alert and oriented,  in no acute distress.     HENT:   Head: Normocephalic, normal hair patterns, no masses or tenderness.  Ears, Nose, and Throat: No gross abnormalities. No pallor or cyanosis.   Eyes: EOMS intact, PERRL, conjunctivae and lids unremarkable. Fundoscopic exam and visual fields not performed.   Neck: No palpable masses or adenopathy, no thyromegaly, no JVD, carotid pulses are full and equal bilaterally and without  Bruits.     Cardiovascular: Regular rhythm, S1 and S2 normal, no S3 or S4.  No murmurs, gallops, or rubs detected.     Pulmonary/Chest: Chest: normal symmetry,  normal respiratory excursion, no intercostal retraction, no use of accessory muscles.             Pulmonary: Normal breath sounds. No rales or ronchi.    Abdominal: Abdomen soft, bowel sounds normoactive, no masses, no hepatosplenomegaly, non-tender, no bruits.     Musculoskeletal: No deformities, clubbing, cyanosis, erythema, or edema observed.     Neurological: No gross motor or sensory deficits noted, affect appropriate, oriented to time, person, place.     Skin: Warm and dry to the touch, no apparent skin lesions or masses noted.     Psychiatric: She has a normal mood and affect. Her behavior is normal. Judgment and thought content normal.         Procedures      No results found for: WBC, HGB, HCT, MCV, PLT  No results found for: GLUCOSE, BUN, CREATININE, EGFRIFNONA, EGFRIFAFRI, BCR, CO2, CALCIUM, PROTENTOTREF, ALBUMIN, LABIL2, AST, ALT  No results found for: CHOL  No results found for: TRIG  No results found for: HDL  No components found for: LDLCALC  No results found for: LDL  No results found for: HDLLDLRATIO  No components found for: CHOLHDL  No results found for: HGBA1C  No results found for: TSH, W9PKSCE, K7NETOT, THYROIDAB        ASSESSMENT AND PLAN  Mrs. Joseph is stable as regards to her heart and I suspect that her symptoms about a week ago was secondary to anxiety.  Paroxysmal arrhythmia cannot entirely be ruled out.  The yield on a Holter monitor/event monitor will be low at this time.  The plan would be to continue her present medication including antiplatelet therapy with aspirin and Plavix, antihypertensive therapy with labetalol, Benicar, Aldactone, clonidine, Lasix and lipid-lowering therapy with Crestor have been continued.  Suspicion for ischemia is low.  If she has recurrence of symptoms I'll consider an event monitor.    Aleksandra was seen today for follow-up.    Diagnoses and all orders for this visit:    Mixed hyperlipidemia    Essential hypertension    Coronary artery disease involving native coronary artery of native heart without angina pectoris        Joshua Ernandez,  MD  8/1/2018  9:54 AM

## 2018-08-23 ENCOUNTER — OFFICE VISIT (OUTPATIENT)
Dept: OBSTETRICS AND GYNECOLOGY | Facility: CLINIC | Age: 63
End: 2018-08-23

## 2018-08-23 VITALS
HEIGHT: 64 IN | DIASTOLIC BLOOD PRESSURE: 57 MMHG | SYSTOLIC BLOOD PRESSURE: 104 MMHG | BODY MASS INDEX: 45.58 KG/M2 | HEART RATE: 68 BPM | WEIGHT: 267 LBS

## 2018-08-23 DIAGNOSIS — Z13.820 ENCOUNTER FOR SCREENING FOR OSTEOPOROSIS: ICD-10-CM

## 2018-08-23 DIAGNOSIS — Z79.890 HORMONE REPLACEMENT THERAPY (POSTMENOPAUSAL): ICD-10-CM

## 2018-08-23 DIAGNOSIS — N95.1 MENOPAUSAL VASOMOTOR SYNDROME: Primary | ICD-10-CM

## 2018-08-23 PROCEDURE — 99203 OFFICE O/P NEW LOW 30 MIN: CPT | Performed by: NURSE PRACTITIONER

## 2018-08-23 RX ORDER — ESTRADIOL 1 MG/1
1 TABLET ORAL DAILY
Qty: 90 TABLET | Refills: 4 | Status: SHIPPED | OUTPATIENT
Start: 2018-08-23 | End: 2020-03-12 | Stop reason: SDUPTHER

## 2018-09-03 PROBLEM — N95.1 MENOPAUSAL VASOMOTOR SYNDROME: Status: ACTIVE | Noted: 2018-09-03

## 2018-09-03 PROBLEM — Z79.890 HORMONE REPLACEMENT THERAPY (POSTMENOPAUSAL): Status: ACTIVE | Noted: 2018-09-03

## 2018-09-03 NOTE — PROGRESS NOTES
Subjective   Aleksandra Joseph is a 63 y.o. female. Needs refills on HRT; her PCP will not refill it, states that she needs to see a GYN.     LMP- ; s/p ANAYELI/BSO for prolapse and endometriosis    Taking 1mg of estradiol and states that she does not have hot flashes or night sweats that are unbearable while taking it but if she tries to stop or go down on the dosage her vasomotor symptoms are unbearable.    Last DEXA- unsure  Colonoscopy-   Mammogram- 2018 no known hx of abnormal  Last pap ~10 years ago; no known hx of abnormal      Gynecologic Exam   The patient's pertinent negatives include no genital itching, genital lesions, genital odor, genital rash, pelvic pain, vaginal bleeding or vaginal discharge. Pertinent negatives include no abdominal pain, constipation, diarrhea, dysuria, headaches, nausea, rash, urgency or vomiting. She is not sexually active. No, her partner does not have an STD. She uses abstinence and hysterectomy ( is impotent) for contraception. She is postmenopausal. Her past medical history is significant for endometriosis, a gynecological surgery and menorrhagia. There is no history of a  section, an ectopic pregnancy, metrorrhagia, miscarriage, ovarian cysts, PID, an STD, a terminated pregnancy or vaginosis.       The following portions of the patient's history were reviewed and updated as appropriate: allergies, current medications, past family history, past medical history, past social history, past surgical history and problem list.    Review of Systems   Constitutional: Negative for activity change, appetite change, fatigue and unexpected weight change.   Respiratory: Negative for chest tightness and shortness of breath.    Cardiovascular: Negative for chest pain, palpitations and leg swelling.   Gastrointestinal: Negative for abdominal distention, abdominal pain, blood in stool, constipation, diarrhea, nausea and vomiting.   Endocrine: Negative for cold  intolerance, heat intolerance, polydipsia, polyphagia and polyuria.        +vasomotor symptoms   Genitourinary: Positive for menorrhagia. Negative for difficulty urinating, dysuria, genital sores, pelvic pain, urgency, vaginal bleeding, vaginal discharge and vaginal pain.   Musculoskeletal: Negative for gait problem and myalgias.   Skin: Negative for color change, pallor and rash.   Neurological: Negative for dizziness, weakness, light-headedness and headaches.   Hematological: Negative for adenopathy.   Psychiatric/Behavioral: Negative for agitation, confusion, dysphoric mood, self-injury and suicidal ideas. The patient is not nervous/anxious.        Objective   Physical Exam   Constitutional: She is oriented to person, place, and time. She appears well-developed and well-nourished. No distress.   Neck: No thyromegaly present.   Cardiovascular: Normal rate, regular rhythm, normal heart sounds and intact distal pulses.    Pulmonary/Chest: Effort normal and breath sounds normal.   Neurological: She is alert and oriented to person, place, and time.   Skin: Skin is warm and dry. Capillary refill takes less than 2 seconds. She is not diaphoretic.   Psychiatric: She has a normal mood and affect. Her behavior is normal.   Nursing note and vitals reviewed.      Assessment/Plan   Aleksandra was seen today for gynecologic exam.    Diagnoses and all orders for this visit:    Menopausal vasomotor syndrome  -     estradiol (ESTRACE) 1 MG tablet; Take 1 tablet by mouth Daily.  -     DEXA Bone Density Axial; Future    Encounter for screening for osteoporosis  -     DEXA Bone Density Axial; Future    Hormone replacement therapy (postmenopausal)       Needs DEXA scan; pt to schedule today. R/B/A and potential s/e of estradiol reviewed; pt verbalized an understanding. No longer needs pap smears.

## 2018-09-04 RX ORDER — ROSUVASTATIN CALCIUM 10 MG/1
TABLET, COATED ORAL
Qty: 90 TABLET | Refills: 2 | Status: SHIPPED | OUTPATIENT
Start: 2018-09-04 | End: 2019-02-06 | Stop reason: SDUPTHER

## 2018-09-04 RX ORDER — ISOSORBIDE MONONITRATE 30 MG/1
30 TABLET, EXTENDED RELEASE ORAL DAILY
Qty: 90 TABLET | Refills: 2 | Status: SHIPPED | OUTPATIENT
Start: 2018-09-04 | End: 2019-02-06 | Stop reason: SDUPTHER

## 2018-10-08 RX ORDER — FUROSEMIDE 20 MG/1
TABLET ORAL
Qty: 90 TABLET | Refills: 2 | Status: SHIPPED | OUTPATIENT
Start: 2018-10-08 | End: 2019-07-11 | Stop reason: SDUPTHER

## 2018-11-19 RX ORDER — LABETALOL 200 MG/1
200 TABLET, FILM COATED ORAL EVERY 12 HOURS SCHEDULED
Qty: 180 TABLET | Refills: 2 | Status: SHIPPED | OUTPATIENT
Start: 2018-11-19 | End: 2019-07-19 | Stop reason: SDUPTHER

## 2018-12-18 RX ORDER — CLONIDINE HYDROCHLORIDE 0.1 MG/1
0.1 TABLET ORAL EVERY 12 HOURS SCHEDULED
Qty: 180 TABLET | Refills: 2 | Status: SHIPPED | OUTPATIENT
Start: 2018-12-18 | End: 2019-08-28 | Stop reason: SDUPTHER

## 2019-02-06 ENCOUNTER — OFFICE VISIT (OUTPATIENT)
Dept: CARDIOLOGY | Facility: CLINIC | Age: 64
End: 2019-02-06

## 2019-02-06 VITALS
HEIGHT: 64 IN | DIASTOLIC BLOOD PRESSURE: 80 MMHG | WEIGHT: 273 LBS | HEART RATE: 60 BPM | OXYGEN SATURATION: 99 % | SYSTOLIC BLOOD PRESSURE: 142 MMHG | BODY MASS INDEX: 46.61 KG/M2

## 2019-02-06 DIAGNOSIS — E78.2 MIXED HYPERLIPIDEMIA: Primary | ICD-10-CM

## 2019-02-06 DIAGNOSIS — I25.10 CORONARY ARTERY DISEASE INVOLVING NATIVE CORONARY ARTERY OF NATIVE HEART WITHOUT ANGINA PECTORIS: ICD-10-CM

## 2019-02-06 DIAGNOSIS — I10 ESSENTIAL HYPERTENSION: ICD-10-CM

## 2019-02-06 PROCEDURE — 99214 OFFICE O/P EST MOD 30 MIN: CPT | Performed by: INTERNAL MEDICINE

## 2019-02-06 RX ORDER — CLOPIDOGREL BISULFATE 75 MG/1
75 TABLET ORAL DAILY
Qty: 90 TABLET | Refills: 3 | Status: SHIPPED | OUTPATIENT
Start: 2019-02-06 | End: 2020-09-01

## 2019-02-06 RX ORDER — ISOSORBIDE MONONITRATE 30 MG/1
30 TABLET, EXTENDED RELEASE ORAL DAILY
Qty: 90 TABLET | Refills: 3 | Status: SHIPPED | OUTPATIENT
Start: 2019-02-06 | End: 2020-03-09

## 2019-02-06 RX ORDER — ROSUVASTATIN CALCIUM 10 MG/1
10 TABLET, COATED ORAL DAILY
Qty: 90 TABLET | Refills: 3 | Status: SHIPPED | OUTPATIENT
Start: 2019-02-06 | End: 2020-03-09

## 2019-02-06 NOTE — PROGRESS NOTES
Aleksandra Joseph  63 y.o. female    02/06/2019  1. Mixed hyperlipidemia    2. Essential hypertension    3. Coronary artery disease involving native coronary artery of native heart without angina pectoris        History of Present Illness  Mrs. Joseph is here for follow-up of her above stated problems.  She is done well from a cardiac standpoint and denied any chest pain, shortness of breath, palpitation, dizziness or syncope.  His been compliant with her medications.  Her blood pressure was in the normal range.      SUBJECTIVE    Allergies   Allergen Reactions   • Adhesive Tape      bandaids   • Naproxen      FACIAL SWELLING   • Tekturna [Aliskiren]      EXTREME EYE SWELLING   • Penicillins Rash         Past Medical History:   Diagnosis Date   • Allergic conjunctivitis    • Borderline glaucoma    • Carpal tunnel syndrome    • Diverticulitis of colon    • Drusen of optic disc    • Hemorrhoids    • Hyperlipidemia    • Hypertension    • Ingrowing nail    • Vitreous detachment of right eye          Past Surgical History:   Procedure Laterality Date   • ANKLE SURGERY Right 08/14/1971    ORIF of trimalleolar fracture, right ankle   • CARDIAC CATHETERIZATION  08/06/1991    chest pain etiology undetermined. Hypertension   • CARPAL TUNNEL RELEASE  02/16/2012    Endoscopic carpal tunnel release. left wrist   • CHOLECYSTECTOMY  09/26/2001    biliary colic   • COLONOSCOPY  11/25/1997    Mild diverticulosis of sigmoid colo w/ no evidence of diverticulitis. Internal hemorrhoidsHematochezia suspected related to internal hemorrhoids   • CORONARY STENT PLACEMENT     • DILATATION AND CURETTAGE  01/06/1984    dysfunctional uterine bleeding   • EXCISION LESION  07/18/2002    Shave excision, right superior earlobe. Intradermal nevus, symptomatic, right superior earlobe   • HAND SURGERY  12/18/1971    excision of ganglion cyst, right palm.   • HYSTERECTOMY  11/20/1992    laparoscopically assisted vaginal hysterectomy & RSO. Pelvic  pain, dyspareunia, menorrhagia, dysmenorrhea. Same plus? endometriosis of right ovary   • KNEE ARTHROSCOPY  12/11/2012    Arthroscopy, medial & lateral meniscectomy, partial posteriorly w/ chondroplasty of the patella. Torn medial meniscus right knee   • NAIL BED REMOVAL/REVISION  01/18/1996    Negative for exercise induced ischemia at a level of 87% of the maximum predicted HR. Abnormal blood pressure response to exercise. Functional Class I   • PAP SMEAR  05/02/1995    NORMAL   • REPLACEMENT TOTAL KNEE Left          Family History   Problem Relation Age of Onset   • Breast cancer Other    • Colon cancer Other    • Diabetes Other    • Heart disease Other    • Hypertension Other    • Thyroid disease Other    • Colon cancer Father    • Breast cancer Mother          Social History     Socioeconomic History   • Marital status:      Spouse name: Not on file   • Number of children: Not on file   • Years of education: Not on file   • Highest education level: Not on file   Social Needs   • Financial resource strain: Not on file   • Food insecurity - worry: Not on file   • Food insecurity - inability: Not on file   • Transportation needs - medical: Not on file   • Transportation needs - non-medical: Not on file   Occupational History   • Not on file   Tobacco Use   • Smoking status: Never Smoker   • Smokeless tobacco: Never Used   Substance and Sexual Activity   • Alcohol use: No   • Drug use: No   • Sexual activity: No     Birth control/protection: Abstinence, Post-menopausal   Other Topics Concern   • Not on file   Social History Narrative   • Not on file         Current Outpatient Medications   Medication Sig Dispense Refill   • aspirin 81 MG EC tablet Take 81 mg by mouth Daily.     • Calcium Carbonate-Vitamin D (CALCIUM 500 + D PO) Take  by mouth.     • CloNIDine (CATAPRES) 0.1 MG tablet TAKE 1 TABLET BY MOUTH EVERY 12 (TWELVE) HOURS. 180 tablet 2   • clopidogrel (PLAVIX) 75 MG tablet Take 1 tablet by mouth  "Daily. 90 tablet 3   • Coenzyme Q10 (CO Q-10) 400 MG capsule Take  by mouth.     • dicyclomine (BENTYL) 10 MG capsule Take 10 mg by mouth As Needed.     • estradiol (ESTRACE) 1 MG tablet Take 1 tablet by mouth Daily. 90 tablet 4   • furosemide (LASIX) 20 MG tablet TAKE 1 TABLET BY MOUTH DAILY. 90 tablet 2   • Glucosamine-Chondroitin (OSTEO BI-FLEX REGULAR STRENGTH PO) Take  by mouth.     • isosorbide mononitrate (IMDUR) 30 MG 24 hr tablet Take 1 tablet by mouth Daily. 90 tablet 3   • labetalol (NORMODYNE) 200 MG tablet TAKE 1 TABLET BY MOUTH EVERY 12 (TWELVE) HOURS. 180 tablet 2   • lansoprazole (PREVACID) 30 MG capsule Take 30 mg by mouth Daily.     • latanoprost (XALATAN) 0.005 % ophthalmic solution Administer 1 drop to both eyes Every Night. 7.5 mL 3   • levothyroxine (SYNTHROID, LEVOTHROID) 100 MCG tablet Take 100 mcg by mouth Daily.     • minocycline (MINOCIN,DYNACIN) 100 MG capsule Take 100 mg by mouth Daily.     • Multiple Vitamins-Minerals (MULTIVITAMIN ADULT PO) Take  by mouth.     • nitroglycerin (NITROSTAT) 0.4 MG SL tablet 1 under the tongue as needed for angina, may repeat q5mins for up three doses 30 tablet 3   • olmesartan (BENICAR) 20 MG tablet Take 2 tablets by mouth Daily. 30 tablet 6   • Omega-3 Fatty Acids (FISH OIL) 1000 MG capsule capsule Take  by mouth 2 (Two) Times a Day With Meals.     • rosuvastatin (CRESTOR) 10 MG tablet Take 1 tablet by mouth Daily. 90 tablet 3   • spironolactone (ALDACTONE) 25 MG tablet Take 1 tablet by mouth Daily. 90 tablet 2   • Sucralfate (CARAFATE PO) Take  by mouth.     • vitamin C (ASCORBIC ACID) 500 MG tablet Take 500 mg by mouth Daily.     • clonazePAM (KlonoPIN) 1 MG tablet Take 1 mg by mouth At Night As Needed for seizures.       No current facility-administered medications for this visit.          OBJECTIVE    /80   Pulse 60   Ht 162.6 cm (64.02\")   Wt 124 kg (273 lb)   SpO2 99%   BMI 46.84 kg/m²         Review of Systems     Constitutional:  " Denies recent weight loss, weight gain, fever or chills, no change in exercise tolerance     HENT:  Denies any hearing loss, epistaxis, hoarseness, or difficulty speaking.     Eyes: Wears eyeglasses or contact lenses     Respiratory:  Denies dyspnea with exertion,no cough, wheezing, or hemoptysis.     Cardiovascular: Negative for palpations, chest pain, orthopnea, PND, peripheral edema, syncope, or claudication.     Gastrointestinal:  Denies change in bowel habits, dyspepsia, ulcer disease, hematochezia, or melena.     Endocrine: Negative for cold intolerance, heat intolerance, polydipsia, polyphagia and polyuria.     Genitourinary: Negative.      Musculoskeletal: Denies any history of arthritic symptoms or back problems     Skin:  Denies any change in hair or nails, rashes, or skin lesions.     Allergic/Immunologic: Negative.  Negative for environmental allergies, food allergies and immunocompromised state.     Neurological:  Denies any history of recurrent headaches, strokes, TIA, or seizure disorder.     Hematological: Denies any food allergies, seasonal allergies, bleeding disorders, or lymphadenopathy.     Psychiatric/Behavioral: Denies any history of depression, substance abuse, or change in cognitive function.         Physical Exam     Constitutional: Cooperative, alert and oriented,  in no acute distress. obese    HENT:   Head: Normocephalic, normal hair patterns, no masses or tenderness.  Ears, Nose, and Throat: No gross abnormalities. No pallor or cyanosis.   Eyes: EOMS intact, PERRL, conjunctivae and lids unremarkable. Fundoscopic exam and visual fields not performed.   Neck: No palpable masses or adenopathy, no thyromegaly, no JVD, carotid pulses are full and equal bilaterally and without  Bruits.     Cardiovascular: Regular rhythm, S1 and S2 normal, no S3 or S4.  No murmurs, gallops, or rubs detected.     Pulmonary/Chest: Chest: normal symmetry,normal respiratory excursion, no intercostal retraction, no  use of accessory muscles.            Pulmonary: Normal breath sounds. No rales or ronchi.    Abdominal: Abdomen soft, bowel sounds normoactive, no masses, no hepatosplenomegaly, non-tender, no bruits.     Musculoskeletal: No deformities, clubbing, cyanosis, erythema, or edema observed.     Neurological: No gross motor or sensory deficits noted, affect appropriate, oriented to time, person, place.     Skin: Warm and dry to the touch, no apparent skin lesions or masses noted.     Psychiatric: She has a normal mood and affect. Her behavior is normal. Judgment and thought content normal.         Procedures      No results found for: WBC, HGB, HCT, MCV, PLT  No results found for: GLUCOSE, BUN, CREATININE, EGFRIFNONA, EGFRIFAFRI, BCR, CO2, CALCIUM, PROTENTOTREF, ALBUMIN, LABIL2, AST, ALT  Lab Results   Component Value Date    CHOL 160 04/20/2018     Lab Results   Component Value Date    TRIG 113 04/20/2018     Lab Results   Component Value Date    HDL 43 04/20/2018     No components found for: LDLCALC  Lab Results   Component Value Date    LDL 96 04/20/2018     No results found for: HDLLDLRATIO  No components found for: CHOLHDL  Lab Results   Component Value Date    HGBA1C 5.8 04/20/2018     No results found for: TSH, B1HKBZO, Y7VEBCV, THYROIDAB        ASSESSMENT AND PLAN  Mrs. Joseph is stable with no evidence of angina, arrhythmia or congestive heart failure.  Antiplatelet therapy with aspirin and Plavix, antihypertensive therapy with clonidine, labetalol, Benicar, lipid-lowering therapy with Crestor, diuretic therapy with Aldactone/Lasix, antianginal therapy with isosorbide mononitrate have been continued.    Aleksandra was seen today for follow-up.    Diagnoses and all orders for this visit:    Mixed hyperlipidemia    Essential hypertension    Coronary artery disease involving native coronary artery of native heart without angina pectoris    Other orders  -     clopidogrel (PLAVIX) 75 MG tablet; Take 1 tablet by mouth  Daily.  -     isosorbide mononitrate (IMDUR) 30 MG 24 hr tablet; Take 1 tablet by mouth Daily.  -     rosuvastatin (CRESTOR) 10 MG tablet; Take 1 tablet by mouth Daily.        Patient's Body mass index is 46.84 kg/m². BMI is above normal parameters. Recommendations include: exercise counseling and nutrition counseling.      Joshua Ernandez MD  2/6/2019  5:39 PM

## 2019-06-06 RX ORDER — NITROGLYCERIN 0.4 MG/1
TABLET SUBLINGUAL
Qty: 30 TABLET | Refills: 3 | Status: SHIPPED | OUTPATIENT
Start: 2019-06-06 | End: 2022-10-14 | Stop reason: SDUPTHER

## 2019-07-11 RX ORDER — FUROSEMIDE 20 MG/1
TABLET ORAL
Qty: 90 TABLET | Refills: 2 | Status: SHIPPED | OUTPATIENT
Start: 2019-07-11 | End: 2020-03-09

## 2019-07-19 RX ORDER — LABETALOL 200 MG/1
TABLET, FILM COATED ORAL
Qty: 180 TABLET | Refills: 2 | Status: SHIPPED | OUTPATIENT
Start: 2019-07-19 | End: 2020-03-09

## 2019-08-28 ENCOUNTER — OFFICE VISIT (OUTPATIENT)
Dept: CARDIOLOGY | Facility: CLINIC | Age: 64
End: 2019-08-28

## 2019-08-28 VITALS
DIASTOLIC BLOOD PRESSURE: 68 MMHG | HEIGHT: 64 IN | OXYGEN SATURATION: 100 % | BODY MASS INDEX: 45.24 KG/M2 | WEIGHT: 265 LBS | SYSTOLIC BLOOD PRESSURE: 122 MMHG | HEART RATE: 72 BPM

## 2019-08-28 DIAGNOSIS — E78.2 MIXED HYPERLIPIDEMIA: ICD-10-CM

## 2019-08-28 DIAGNOSIS — I10 ESSENTIAL HYPERTENSION: Primary | ICD-10-CM

## 2019-08-28 DIAGNOSIS — I25.10 CORONARY ARTERY DISEASE INVOLVING NATIVE CORONARY ARTERY OF NATIVE HEART WITHOUT ANGINA PECTORIS: ICD-10-CM

## 2019-08-28 PROCEDURE — 99214 OFFICE O/P EST MOD 30 MIN: CPT | Performed by: INTERNAL MEDICINE

## 2019-08-28 RX ORDER — CLONIDINE HYDROCHLORIDE 0.1 MG/1
TABLET ORAL
Qty: 180 TABLET | Refills: 2 | Status: SHIPPED | OUTPATIENT
Start: 2019-08-28

## 2019-08-28 RX ORDER — DOXYCYCLINE HYCLATE 100 MG/1
100 CAPSULE ORAL DAILY
Refills: 2 | COMMUNITY
Start: 2019-07-19

## 2019-08-28 NOTE — PROGRESS NOTES
Aleksandra Joseph  64 y.o. female    08/28/2019  1. Essential hypertension    2. Mixed hyperlipidemia    3. Coronary artery disease involving native coronary artery of native heart without angina pectoris        History of Present Illness  Aleksandra Joseph is here for follow-up of her above-stated problems.  She is done well from a cardiac standpoint and denied any chest pain, shortness of breath, palpitation, dizziness or syncope.  She has lost weight since I last saw her.  She has been compliant with her medications.  She underwent PCI to right coronary artery in June 2011  She is due to see Dr. Rueda next month and blood work will be arranged at that time.    SUBJECTIVE    Allergies   Allergen Reactions   • Adhesive Tape      bandaids   • Naproxen      FACIAL SWELLING   • Tekturna [Aliskiren]      EXTREME EYE SWELLING   • Penicillins Rash         Past Medical History:   Diagnosis Date   • Allergic conjunctivitis    • Borderline glaucoma    • Carpal tunnel syndrome    • Diverticulitis of colon    • Drusen of optic disc    • Hemorrhoids    • Hyperlipidemia    • Hypertension    • Ingrowing nail    • Vitreous detachment of right eye          Past Surgical History:   Procedure Laterality Date   • ANKLE SURGERY Right 08/14/1971    ORIF of trimalleolar fracture, right ankle   • CARDIAC CATHETERIZATION  08/06/1991    chest pain etiology undetermined. Hypertension   • CARPAL TUNNEL RELEASE  02/16/2012    Endoscopic carpal tunnel release. left wrist   • CHOLECYSTECTOMY  09/26/2001    biliary colic   • COLONOSCOPY  11/25/1997    Mild diverticulosis of sigmoid colo w/ no evidence of diverticulitis. Internal hemorrhoidsHematochezia suspected related to internal hemorrhoids   • CORONARY STENT PLACEMENT     • DILATATION AND CURETTAGE  01/06/1984    dysfunctional uterine bleeding   • EXCISION LESION  07/18/2002    Shave excision, right superior earlobe. Intradermal nevus, symptomatic, right superior earlobe   • HAND SURGERY   12/18/1971    excision of ganglion cyst, right palm.   • HYSTERECTOMY  11/20/1992    laparoscopically assisted vaginal hysterectomy & RSO. Pelvic pain, dyspareunia, menorrhagia, dysmenorrhea. Same plus? endometriosis of right ovary   • KNEE ARTHROSCOPY  12/11/2012    Arthroscopy, medial & lateral meniscectomy, partial posteriorly w/ chondroplasty of the patella. Torn medial meniscus right knee   • NAIL BED REMOVAL/REVISION  01/18/1996    Negative for exercise induced ischemia at a level of 87% of the maximum predicted HR. Abnormal blood pressure response to exercise. Functional Class I   • PAP SMEAR  05/02/1995    NORMAL   • REPLACEMENT TOTAL KNEE Left          Family History   Problem Relation Age of Onset   • Breast cancer Other    • Colon cancer Other    • Diabetes Other    • Heart disease Other    • Hypertension Other    • Thyroid disease Other    • Colon cancer Father    • Breast cancer Mother          Social History     Socioeconomic History   • Marital status:      Spouse name: Not on file   • Number of children: Not on file   • Years of education: Not on file   • Highest education level: Not on file   Tobacco Use   • Smoking status: Never Smoker   • Smokeless tobacco: Never Used   Substance and Sexual Activity   • Alcohol use: No   • Drug use: No   • Sexual activity: No     Birth control/protection: Abstinence, Post-menopausal         Current Outpatient Medications   Medication Sig Dispense Refill   • aspirin 81 MG EC tablet Take 81 mg by mouth Daily.     • Calcium Carbonate-Vitamin D (CALCIUM 500 + D PO) Take  by mouth.     • CloNIDine (CATAPRES) 0.1 MG tablet TAKE 1 TABLET BY MOUTH EVERY 12 (TWELVE) HOURS. 180 tablet 2   • clopidogrel (PLAVIX) 75 MG tablet Take 1 tablet by mouth Daily. 90 tablet 3   • Coenzyme Q10 (CO Q-10) 400 MG capsule Take  by mouth.     • dicyclomine (BENTYL) 10 MG capsule Take 10 mg by mouth As Needed.     • doxycycline (VIBRAMYCIN) 100 MG capsule Take 100 mg by mouth Daily.   "2   • estradiol (ESTRACE) 1 MG tablet Take 1 tablet by mouth Daily. 90 tablet 4   • furosemide (LASIX) 20 MG tablet TAKE 1 TABLET BY MOUTH EVERY DAY 90 tablet 2   • Glucosamine-Chondroitin (OSTEO BI-FLEX REGULAR STRENGTH PO) Take  by mouth.     • isosorbide mononitrate (IMDUR) 30 MG 24 hr tablet Take 1 tablet by mouth Daily. 90 tablet 3   • labetalol (NORMODYNE) 200 MG tablet TAKE 1 TABLET BY MOUTH EVERY 12 HOURS 180 tablet 2   • lansoprazole (PREVACID) 30 MG capsule Take 30 mg by mouth Daily.     • latanoprost (XALATAN) 0.005 % ophthalmic solution Administer 1 drop to both eyes Every Night. 7.5 mL 3   • levothyroxine (SYNTHROID, LEVOTHROID) 100 MCG tablet Take 100 mcg by mouth Daily.     • Multiple Vitamins-Minerals (MULTIVITAMIN ADULT PO) Take  by mouth.     • nitroglycerin (NITROSTAT) 0.4 MG SL tablet 1 under the tongue as needed for angina, may repeat q5mins for up three doses 30 tablet 3   • olmesartan (BENICAR) 20 MG tablet Take 2 tablets by mouth Daily. 30 tablet 6   • Omega-3 Fatty Acids (FISH OIL) 1000 MG capsule capsule Take  by mouth 2 (Two) Times a Day With Meals.     • rosuvastatin (CRESTOR) 10 MG tablet Take 1 tablet by mouth Daily. 90 tablet 3   • spironolactone (ALDACTONE) 25 MG tablet Take 1 tablet by mouth Daily. 90 tablet 2   • Sucralfate (CARAFATE PO) Take  by mouth As Needed.     • vitamin C (ASCORBIC ACID) 500 MG tablet Take 500 mg by mouth Daily.     • clonazePAM (KlonoPIN) 1 MG tablet Take 1 mg by mouth At Night As Needed for seizures.     • minocycline (MINOCIN,DYNACIN) 100 MG capsule Take 100 mg by mouth Daily.       No current facility-administered medications for this visit.          OBJECTIVE    /68   Pulse 72   Ht 162.6 cm (64.02\")   Wt 120 kg (265 lb)   SpO2 100%   BMI 45.46 kg/m²         Review of Systems     Constitutional:  Denies recent weight loss, weight gain, fever or chills, no change in exercise tolerance     HENT:  Denies any hearing loss, epistaxis, hoarseness, or " difficulty speaking.     Eyes: Wears eyeglasses or contact lenses     Respiratory:  Denies dyspnea with exertion,no cough, wheezing, or hemoptysis.     Cardiovascular: Negative for palpations, chest pain, orthopnea, PND, peripheral edema, syncope, or claudication.     Gastrointestinal:  Denies change in bowel habits, dyspepsia, ulcer disease, hematochezia, or melena.     Endocrine: Negative for cold intolerance, heat intolerance, polydipsia, polyphagia and polyuria.  Hypothyroidism    Genitourinary: Negative.      Musculoskeletal: Denies any history of arthritic symptoms or back problems     Skin:  Denies any change in hair or nails, rashes, or skin lesions.     Allergic/Immunologic: Negative.  Negative for environmental allergies, food allergies and immunocompromised state.     Neurological:  Denies any history of recurrent headaches, strokes, TIA, or seizure disorder.     Hematological: Denies any food allergies, seasonal allergies, bleeding disorders, or lymphadenopathy.     Psychiatric/Behavioral: Denies any history of depression, substance abuse, or change in cognitive function.         Physical Exam     Constitutional: Cooperative, alert and oriented,  in no acute distress.     HENT:   Head: Normocephalic, normal hair patterns, no masses or tenderness.  Ears, Nose, and Throat: No gross abnormalities. No pallor or cyanosis.  Eyes: EOMS intact, PERRL, conjunctivae and lids unremarkable. Fundoscopic exam and visual fields not performed.   Neck: No palpable masses or adenopathy, no thyromegaly, no JVD, carotid pulses are full and equal bilaterally and without  Bruits.     Cardiovascular: Regular rhythm, S1 and S2 normal, no S3 or S4.  No murmurs, gallops, or rubs detected.     Pulmonary/Chest: Chest: normal symmetry, no tenderness to palpation, normal respiratory excursion, no intercostal retraction, no use of accessory muscles.            Pulmonary: Normal breath sounds. No rales or ronchi.    Abdominal: Abdomen  soft, bowel sounds normoactive, no masses, no hepatosplenomegaly, non-tender, no bruits.     Musculoskeletal: No deformities, clubbing, cyanosis, erythema, or edema observed.     Neurological: No gross motor or sensory deficits noted, affect appropriate, oriented to time, person, place.     Skin: Warm and dry to the touch, no apparent skin lesions or masses noted.     Psychiatric: She has a normal mood and affect. Her behavior is normal. Judgment and thought content normal.         Procedures      No results found for: WBC, HGB, HCT, MCV, PLT  Lab Results   Component Value Date    BUN 28 (H) 04/20/2018    CREATININE 1 04/20/2018    CALCIUM 9.3 04/20/2018    ALBUMIN 3.9 04/20/2018    AST 12 (L) 04/20/2018    ALT 31 04/20/2018     Lab Results   Component Value Date    CHOL 160 04/20/2018     Lab Results   Component Value Date    TRIG 113 04/20/2018     Lab Results   Component Value Date    HDL 43 04/20/2018     No components found for: LDLCALC  Lab Results   Component Value Date    LDL 96 04/20/2018     No results found for: HDLLDLRATIO  No components found for: CHOLHDL  Lab Results   Component Value Date    HGBA1C 5.8 04/20/2018     Lab Results   Component Value Date    TSH 1.12 04/20/2018           ASSESSMENT AND PLAN  Mr. Joseph is stable at this time with no clinical evidence of progression of coronary artery disease.  I have continued antiplatelet therapy with aspirin and Plavix, antihypertensive therapy with clonidine, labetalol, Benicar, Aldactone and lipid-lowering therapy with Crestor and antianginal therapy with isosorbide mononitrate has been continued.    Aleksandra was seen today for follow-up.    Diagnoses and all orders for this visit:    Essential hypertension    Mixed hyperlipidemia    Coronary artery disease involving native coronary artery of native heart without angina pectoris        Patient's Body mass index is 45.46 kg/m². BMI is above normal parameters. Recommendations include: exercise counseling  and nutrition counseling.  Patient is a non-smoker.    Joshua Ernandez MD  8/28/2019  10:18 AM

## 2020-03-09 RX ORDER — ISOSORBIDE MONONITRATE 30 MG/1
TABLET, EXTENDED RELEASE ORAL
Qty: 90 TABLET | Refills: 2 | Status: SHIPPED | OUTPATIENT
Start: 2020-03-09 | End: 2020-11-16 | Stop reason: SDUPTHER

## 2020-03-09 RX ORDER — FUROSEMIDE 20 MG/1
TABLET ORAL
Qty: 90 TABLET | Refills: 2 | Status: SHIPPED | OUTPATIENT
Start: 2020-03-09 | End: 2020-12-13 | Stop reason: SDUPTHER

## 2020-03-09 RX ORDER — LABETALOL 200 MG/1
TABLET, FILM COATED ORAL
Qty: 180 TABLET | Refills: 2 | Status: SHIPPED | OUTPATIENT
Start: 2020-03-09 | End: 2020-12-13 | Stop reason: SDUPTHER

## 2020-03-09 RX ORDER — ROSUVASTATIN CALCIUM 10 MG/1
TABLET, COATED ORAL
Qty: 90 TABLET | Refills: 2 | Status: SHIPPED | OUTPATIENT
Start: 2020-03-09 | End: 2021-03-23 | Stop reason: SDUPTHER

## 2020-03-12 ENCOUNTER — PROCEDURE VISIT (OUTPATIENT)
Dept: OBSTETRICS AND GYNECOLOGY | Facility: CLINIC | Age: 65
End: 2020-03-12

## 2020-03-12 VITALS
WEIGHT: 272 LBS | DIASTOLIC BLOOD PRESSURE: 72 MMHG | SYSTOLIC BLOOD PRESSURE: 130 MMHG | HEIGHT: 64 IN | BODY MASS INDEX: 46.44 KG/M2

## 2020-03-12 DIAGNOSIS — N95.1 MENOPAUSAL VASOMOTOR SYNDROME: ICD-10-CM

## 2020-03-12 PROCEDURE — 99212 OFFICE O/P EST SF 10 MIN: CPT | Performed by: NURSE PRACTITIONER

## 2020-03-12 RX ORDER — ESTRADIOL 1 MG/1
1 TABLET ORAL DAILY
Qty: 90 TABLET | Refills: 1 | Status: SHIPPED | OUTPATIENT
Start: 2020-03-12

## 2020-03-12 NOTE — PROGRESS NOTES
Subjective   Aleksandra Joseph is a 64 y.o. hormone replacement therapy    Mammo: BI-RADS 1 09/16/19  ANAYELI and BSO, 1997 r/t uterine prolapse and endometriosis  HRT: Yes, estrace 1mg    Pt presents for refill on estrace tablets.  She has been on hormone replacement therapy for past 12 years.  Around 5 years ago she tried discontinuing hormones, but her hot flashes returned.  She reports her  of 40 years passed away this past fall which has been hard on her.        The following portions of the patient's history were reviewed and updated as appropriate: allergies, current medications, past family history, past medical history, past social history, past surgical history and problem list.    Review of Systems   Constitutional: Negative for chills, diaphoresis, fatigue, fever and unexpected weight change.   Respiratory: Negative for apnea, chest tightness and shortness of breath.    Cardiovascular: Negative for chest pain, palpitations and leg swelling.   Gastrointestinal: Negative for abdominal distention, constipation and diarrhea.   Genitourinary: Negative for decreased urine volume, difficulty urinating, dysuria, enuresis, flank pain, frequency, genital sores, hematuria, pelvic pain, urgency, vaginal bleeding, vaginal discharge and vaginal pain.   Skin: Negative for rash.   Neurological: Negative for headaches.   Psychiatric/Behavioral: Negative for sleep disturbance and suicidal ideas.       Objective   Physical Exam   Constitutional: She is oriented to person, place, and time. Vital signs are normal. She appears well-developed and well-nourished. No distress.   Cardiovascular: Normal rate, regular rhythm and normal heart sounds.   Pulmonary/Chest: Effort normal and breath sounds normal.   Neurological: She is alert and oriented to person, place, and time.   Skin: Skin is warm and dry. She is not diaphoretic.   Psychiatric: She has a normal mood and affect. Her behavior is normal.   Nursing note and vitals  reviewed.        Assessment/Plan   Diagnoses and all orders for this visit:    Menopausal vasomotor syndrome  -     estradiol (ESTRACE) 1 MG tablet; Take 1 tablet by mouth Daily.    Pt declines annual gynecological breast and pelvic exam.  Discussed risks of HRT including increased risk of heart attack, stroke, blood clots, etc.  I do not feel comfortable for pt to continue HRT due to pt multiple comorbidities.  Plan to wean down and discontinue HRT.  RTC for annual gynecological exam or sooner if needed.

## 2020-06-15 DIAGNOSIS — I25.10 CORONARY ARTERY DISEASE INVOLVING NATIVE CORONARY ARTERY OF NATIVE HEART WITHOUT ANGINA PECTORIS: Primary | ICD-10-CM

## 2020-06-16 ENCOUNTER — OFFICE VISIT (OUTPATIENT)
Dept: CARDIOLOGY | Facility: CLINIC | Age: 65
End: 2020-06-16

## 2020-06-16 VITALS
SYSTOLIC BLOOD PRESSURE: 136 MMHG | HEIGHT: 64 IN | BODY MASS INDEX: 47.29 KG/M2 | DIASTOLIC BLOOD PRESSURE: 80 MMHG | WEIGHT: 277 LBS | OXYGEN SATURATION: 100 % | HEART RATE: 50 BPM

## 2020-06-16 DIAGNOSIS — I10 ESSENTIAL HYPERTENSION: ICD-10-CM

## 2020-06-16 DIAGNOSIS — I25.10 CORONARY ARTERY DISEASE INVOLVING NATIVE CORONARY ARTERY OF NATIVE HEART WITHOUT ANGINA PECTORIS: ICD-10-CM

## 2020-06-16 DIAGNOSIS — E78.2 MIXED HYPERLIPIDEMIA: Primary | ICD-10-CM

## 2020-06-16 PROCEDURE — 99213 OFFICE O/P EST LOW 20 MIN: CPT | Performed by: INTERNAL MEDICINE

## 2020-06-16 NOTE — PROGRESS NOTES
Aleksandra Joseph  64 y.o. female      1. Mixed hyperlipidemia    2. Essential hypertension    3. Coronary artery disease involving native coronary artery of native heart without angina pectoris        History of Present Illness:  Aleksandra Joseph is here for follow-up of her above-stated problems.  She is done well from a cardiac standpoint and denied any chest pain, shortness of breath, palpitation, dizziness or syncope.  She has been compliant with her medications.  She has been physically quite active without any restrictions.  She underwent PCI to right coronary artery in June 2011  Her lipid profiles were in the normal range when checked in September last year and I understand that this will be rechecked again by Dr. Rueda in the near future.    SUBJECTIVE    Allergies   Allergen Reactions   • Adhesive Tape      bandaids   • Naproxen      FACIAL SWELLING   • Tekturna [Aliskiren]      EXTREME EYE SWELLING   • Penicillins Rash         Past Medical History:   Diagnosis Date   • Allergic conjunctivitis    • Borderline glaucoma    • Carpal tunnel syndrome    • Diverticulitis of colon    • Drusen of optic disc    • Hemorrhoids    • Hyperlipidemia    • Hypertension    • Ingrowing nail    • Vitreous detachment of right eye          Past Surgical History:   Procedure Laterality Date   • ANKLE SURGERY Right 08/14/1971    ORIF of trimalleolar fracture, right ankle   • CARDIAC CATHETERIZATION  08/06/1991    chest pain etiology undetermined. Hypertension   • CARPAL TUNNEL RELEASE  02/16/2012    Endoscopic carpal tunnel release. left wrist   • CHOLECYSTECTOMY  09/26/2001    biliary colic   • COLONOSCOPY  11/25/1997    Mild diverticulosis of sigmoid colo w/ no evidence of diverticulitis. Internal hemorrhoidsHematochezia suspected related to internal hemorrhoids   • CORONARY STENT PLACEMENT     • DILATATION AND CURETTAGE  01/06/1984    dysfunctional uterine bleeding   • EXCISION LESION  07/18/2002    Shave excision, right  superior earlobe. Intradermal nevus, symptomatic, right superior earlobe   • HAND SURGERY  12/18/1971    excision of ganglion cyst, right palm.   • HYSTERECTOMY  11/20/1992    laparoscopically assisted vaginal hysterectomy & RSO. Pelvic pain, dyspareunia, menorrhagia, dysmenorrhea. Same plus? endometriosis of right ovary   • KNEE ARTHROSCOPY  12/11/2012    Arthroscopy, medial & lateral meniscectomy, partial posteriorly w/ chondroplasty of the patella. Torn medial meniscus right knee   • NAIL BED REMOVAL/REVISION  01/18/1996    Negative for exercise induced ischemia at a level of 87% of the maximum predicted HR. Abnormal blood pressure response to exercise. Functional Class I   • PAP SMEAR  05/02/1995    NORMAL   • REPLACEMENT TOTAL KNEE Left          Family History   Problem Relation Age of Onset   • Breast cancer Other    • Colon cancer Other    • Diabetes Other    • Heart disease Other    • Hypertension Other    • Thyroid disease Other    • Colon cancer Father    • Breast cancer Mother          Social History     Socioeconomic History   • Marital status:      Spouse name: Not on file   • Number of children: Not on file   • Years of education: Not on file   • Highest education level: Not on file   Tobacco Use   • Smoking status: Never Smoker   • Smokeless tobacco: Never Used   Substance and Sexual Activity   • Alcohol use: No   • Drug use: No   • Sexual activity: Never     Birth control/protection: Abstinence, Post-menopausal         Current Outpatient Medications   Medication Sig Dispense Refill   • aspirin 81 MG EC tablet Take 81 mg by mouth Daily.     • Calcium Carbonate-Vitamin D (CALCIUM 500 + D PO) Take  by mouth.     • clonazePAM (KlonoPIN) 1 MG tablet Take 1 mg by mouth At Night As Needed for seizures.     • CloNIDine (CATAPRES) 0.1 MG tablet TAKE 1 TABLET BY MOUTH EVERY 12 HOURS 180 tablet 2   • clopidogrel (PLAVIX) 75 MG tablet Take 1 tablet by mouth Daily. 90 tablet 3   • Coenzyme Q10 (CO Q-10)  "400 MG capsule Take  by mouth.     • dicyclomine (BENTYL) 10 MG capsule Take 10 mg by mouth As Needed.     • doxycycline (VIBRAMYCIN) 100 MG capsule Take 100 mg by mouth Daily.  2   • estradiol (ESTRACE) 1 MG tablet Take 1 tablet by mouth Daily. 90 tablet 1   • furosemide (LASIX) 20 MG tablet TAKE 1 TABLET BY MOUTH EVERY DAY 90 tablet 2   • Glucosamine-Chondroitin (OSTEO BI-FLEX REGULAR STRENGTH PO) Take  by mouth.     • isosorbide mononitrate (IMDUR) 30 MG 24 hr tablet TAKE 1 TABLET BY MOUTH EVERY DAY 90 tablet 2   • labetalol (NORMODYNE) 200 MG tablet TAKE 1 TABLET BY MOUTH EVERY 12 HOURS 180 tablet 2   • lansoprazole (PREVACID) 30 MG capsule Take 30 mg by mouth Daily.     • latanoprost (XALATAN) 0.005 % ophthalmic solution Administer 1 drop to both eyes Every Night. 7.5 mL 3   • levothyroxine (SYNTHROID, LEVOTHROID) 100 MCG tablet Take 100 mcg by mouth Daily.     • minocycline (MINOCIN,DYNACIN) 100 MG capsule Take 100 mg by mouth Daily.     • Multiple Vitamins-Minerals (MULTIVITAMIN ADULT PO) Take  by mouth.     • nitroglycerin (NITROSTAT) 0.4 MG SL tablet 1 under the tongue as needed for angina, may repeat q5mins for up three doses 30 tablet 3   • olmesartan (BENICAR) 20 MG tablet Take 2 tablets by mouth Daily. 30 tablet 6   • Omega-3 Fatty Acids (FISH OIL) 1000 MG capsule capsule Take  by mouth 2 (Two) Times a Day With Meals.     • rosuvastatin (CRESTOR) 10 MG tablet TAKE 1 TABLET BY MOUTH EVERY DAY 90 tablet 2   • spironolactone (ALDACTONE) 25 MG tablet Take 1 tablet by mouth Daily. 90 tablet 2   • Sucralfate (CARAFATE PO) Take  by mouth As Needed.     • vitamin C (ASCORBIC ACID) 500 MG tablet Take 500 mg by mouth Daily.       No current facility-administered medications for this visit.          OBJECTIVE    /80 (BP Location: Left arm, Patient Position: Sitting, Cuff Size: Large Adult)   Pulse 50   Ht 162.6 cm (64\")   Wt 126 kg (277 lb)   SpO2 100%   BMI 47.55 kg/m²         Review of Systems "     Constitutional:  Denies recent weight loss, weight gain, fever or chills, no change in exercise tolerance     HENT:  Denies any hearing loss, epistaxis, hoarseness, or difficulty speaking.     Eyes: Wears eyeglasses or contact lenses     Respiratory:  Denies dyspnea with exertion,no cough, wheezing, or hemoptysis.     Cardiovascular: Negative for palpations, chest pain, orthopnea, PND, peripheral edema, syncope, or claudication.     Gastrointestinal:  Denies change in bowel habits, dyspepsia, ulcer disease, hematochezia, or melena.     Endocrine: Negative for cold intolerance, heat intolerance, polydipsia, polyphagia and polyuria.  Hypothyroidism    Genitourinary: Negative.      Musculoskeletal: Denies any history of arthritic symptoms or back problems     Skin:  Denies any change in hair or nails, rashes, or skin lesions.     Allergic/Immunologic: Negative.  Negative for environmental allergies, food allergies and immunocompromised state.     Neurological:  Denies any history of recurrent headaches, strokes, TIA, or seizure disorder.     Hematological: Denies any food allergies, seasonal allergies, bleeding disorders, or lymphadenopathy.     Psychiatric/Behavioral: Denies any history of depression, substance abuse, or change in cognitive function.         Physical Exam     Constitutional: Cooperative, alert and oriented,  in no acute distress.     HENT:   Head: Normocephalic, normal hair patterns, no masses or tenderness.  Ears, Nose, and Throat: No gross abnormalities. No pallor or cyanosis.  Eyes: EOMS intact, PERRL, conjunctivae and lids unremarkable. Fundoscopic exam and visual fields not performed.   Neck: No palpable masses or adenopathy, no thyromegaly, no JVD, carotid pulses are full and equal bilaterally and without  Bruits.     Cardiovascular: Regular rhythm, S1 and S2 normal, no S3 or S4.  No murmurs, gallops, or rubs detected.     Pulmonary/Chest: Chest: normal symmetry, no tenderness to palpation,  normal respiratory excursion, no intercostal retraction, no use of accessory muscles.            Pulmonary: Normal breath sounds. No rales or ronchi.    Abdominal: Abdomen soft, bowel sounds normoactive, no masses, no hepatosplenomegaly, non-tender, no bruits.     Musculoskeletal: No deformities, clubbing, cyanosis, erythema, or edema observed.     Neurological: No gross motor or sensory deficits noted, affect appropriate, oriented to time, person, place.     Skin: Warm and dry to the touch, no apparent skin lesions or masses noted.     Psychiatric: She has a normal mood and affect. Her behavior is normal. Judgment and thought content normal.         Procedures      Lab Results   Component Value Date    WBC 6.1 09/16/2019    HGB 11.9 (L) 09/16/2019    HCT 37.1 (L) 09/16/2019    MCV 90 09/16/2019     09/16/2019     Lab Results   Component Value Date    BUN 28 (H) 04/20/2018    CREATININE 1 04/20/2018    CALCIUM 9.3 04/20/2018    ALBUMIN 3.9 04/20/2018    AST 12 (L) 04/20/2018    ALT 31 04/20/2018     Lab Results   Component Value Date    CHOL 154 09/16/2019    CHOL 160 04/20/2018     Lab Results   Component Value Date    TRIG 159 (H) 09/16/2019    TRIG 113 04/20/2018     Lab Results   Component Value Date    HDL 35 (L) 09/16/2019    HDL 43 04/20/2018     No components found for: LDLCALC  Lab Results   Component Value Date    LDL 84 09/16/2019    LDL 96 04/20/2018     No results found for: HDLLDLRATIO  No components found for: CHOLHDL  Lab Results   Component Value Date    HGBA1C 6.8 (H) 02/25/2020     Lab Results   Component Value Date    TSH 1.12 04/20/2018           ASSESSMENT AND PLAN  Mr. Joseph is stable at this time with no clinical evidence of progression of coronary artery disease.  I have continued antiplatelet therapy with aspirin and Plavix, antihypertensive therapy with clonidine, labetalol, Benicar, Aldactone and lipid-lowering therapy with Crestor and antianginal therapy with isosorbide  mononitrate has been continued.    Aleksandra was seen today for follow-up.    Diagnoses and all orders for this visit:    Mixed hyperlipidemia    Essential hypertension    Coronary artery disease involving native coronary artery of native heart without angina pectoris        Patient's Body mass index is 47.55 kg/m². BMI is above normal parameters. Recommendations include: exercise counseling and nutrition counseling.  Patient is a non-smoker.    Joshua Ernandez MD  6/16/2020  11:56

## 2020-07-06 ENCOUNTER — TRANSCRIBE ORDERS (OUTPATIENT)
Dept: ORTHOPEDIC SURGERY | Facility: CLINIC | Age: 65
End: 2020-07-06

## 2020-07-06 DIAGNOSIS — M25.512 LEFT SHOULDER PAIN, UNSPECIFIED CHRONICITY: Primary | ICD-10-CM

## 2020-07-15 ENCOUNTER — OFFICE VISIT (OUTPATIENT)
Dept: ORTHOPEDIC SURGERY | Facility: CLINIC | Age: 65
End: 2020-07-15

## 2020-07-15 VITALS — BODY MASS INDEX: 45.24 KG/M2 | HEIGHT: 64 IN | WEIGHT: 265 LBS

## 2020-07-15 DIAGNOSIS — M75.112 NONTRAUMATIC INCOMPLETE TEAR OF LEFT ROTATOR CUFF: Primary | ICD-10-CM

## 2020-07-15 DIAGNOSIS — M25.512 ACUTE PAIN OF LEFT SHOULDER: ICD-10-CM

## 2020-07-15 PROBLEM — G89.29 CHRONIC LEFT SHOULDER PAIN: Status: ACTIVE | Noted: 2020-07-15

## 2020-07-15 PROCEDURE — 20610 DRAIN/INJ JOINT/BURSA W/O US: CPT | Performed by: NURSE PRACTITIONER

## 2020-07-15 PROCEDURE — 99213 OFFICE O/P EST LOW 20 MIN: CPT | Performed by: NURSE PRACTITIONER

## 2020-07-15 RX ORDER — TRIAMCINOLONE ACETONIDE 40 MG/ML
40 INJECTION, SUSPENSION INTRA-ARTICULAR; INTRAMUSCULAR
Status: COMPLETED | OUTPATIENT
Start: 2020-07-15 | End: 2020-07-15

## 2020-07-15 RX ORDER — LIDOCAINE HYDROCHLORIDE 20 MG/ML
2 INJECTION, SOLUTION INFILTRATION; PERINEURAL
Status: COMPLETED | OUTPATIENT
Start: 2020-07-15 | End: 2020-07-15

## 2020-07-15 RX ADMIN — LIDOCAINE HYDROCHLORIDE 2 ML: 20 INJECTION, SOLUTION INFILTRATION; PERINEURAL at 14:09

## 2020-07-15 RX ADMIN — TRIAMCINOLONE ACETONIDE 40 MG: 40 INJECTION, SUSPENSION INTRA-ARTICULAR; INTRAMUSCULAR at 14:09

## 2020-07-15 NOTE — PROGRESS NOTES
Answers for HPI/ROS submitted by the patient on 7/11/2020   What is the primary reason for your visit?: Other  Please describe your symptoms.: Pain in left shoulder and arm muscles  Have you had these symptoms before?: No  How long have you been having these symptoms?: Greater than 2 weeks  Please list any medications you are currently taking for this condition.: lidocaine patch  Please describe any probable cause for these symptoms. : I might have hurt it just working around the house      Aleksandra Joseph is a 65 y.o. female   Primary provider:  Jacinto Rueda MD       Chief Complaint   Patient presents with   • Left Shoulder - Shoulder Pain       HISTORY OF PRESENT ILLNESS: Patient is a 65-year-old female who presents today for evaluation of left shoulder pain.  Patient reports that 1 month ago she was moving an object in her house when she felt a sharp pain.  She reports that object was light, she did not feel a pop at time of injury.  Patient then states the next day she experienced increased pain and decreased range of motion in left shoulder.  She reports the pain is achy, moderate, intermittent.  Patient was seen by her primary care doctor for this issue.  X-rays were taken on 6/17 which were negative.  Patient also had an MRI performed which revealed:     1.  The long head of the biceps is dissected and/or partially torn; the tendon remains attached and is not avulsed.    2.  The supraspinatus tendon has interstitial tearing and superficial undersurface tear with the bulk of the tendon remaining attached.    Patient reports she has been doing at home exercises, which have improved pain and range of motion.  She rates her current pain today a 6 out of 10.  Pain is in the biceps area and back of shoulder.  Patient is experiencing pain with range of motion.  Patient cannot take NSAIDs due to allergy to naproxen and current use of blood thinner.      Arm Pain    The incident occurred more than 1 week ago.  The incident occurred at home. The pain is present in the left shoulder. The quality of the pain is described as aching. The pain is at a severity of 6/10. The pain is moderate. The pain has been intermittent since the incident. The symptoms are aggravated by movement. Treatments tried: lidocaine patch.         CONCURRENT MEDICAL HISTORY:    Past Medical History:   Diagnosis Date   • Allergic conjunctivitis    • Borderline glaucoma    • Carpal tunnel syndrome    • Diverticulitis of colon    • Drusen of optic disc    • Hemorrhoids    • Hyperlipidemia    • Hypertension    • Ingrowing nail    • Vitreous detachment of right eye        Allergies   Allergen Reactions   • Adhesive Tape      bandaids   • Lortab [Hydrocodone-Acetaminophen] Unknown - Low Severity     unknown   • Naproxen      FACIAL SWELLING   • Tekturna [Aliskiren]      EXTREME EYE SWELLING   • Penicillins Rash         Current Outpatient Medications:   •  aspirin 81 MG EC tablet, Take 81 mg by mouth Daily., Disp: , Rfl:   •  Calcium Carbonate-Vitamin D (CALCIUM 500 + D PO), Take  by mouth., Disp: , Rfl:   •  CloNIDine (CATAPRES) 0.1 MG tablet, TAKE 1 TABLET BY MOUTH EVERY 12 HOURS, Disp: 180 tablet, Rfl: 2  •  clopidogrel (PLAVIX) 75 MG tablet, Take 1 tablet by mouth Daily., Disp: 90 tablet, Rfl: 3  •  Coenzyme Q10 (CO Q-10) 400 MG capsule, Take  by mouth., Disp: , Rfl:   •  dicyclomine (BENTYL) 10 MG capsule, Take 10 mg by mouth As Needed., Disp: , Rfl:   •  doxycycline (VIBRAMYCIN) 100 MG capsule, Take 100 mg by mouth Daily., Disp: , Rfl: 2  •  estradiol (ESTRACE) 1 MG tablet, Take 1 tablet by mouth Daily., Disp: 90 tablet, Rfl: 1  •  furosemide (LASIX) 20 MG tablet, TAKE 1 TABLET BY MOUTH EVERY DAY, Disp: 90 tablet, Rfl: 2  •  Glucosamine-Chondroitin (OSTEO BI-FLEX REGULAR STRENGTH PO), Take  by mouth., Disp: , Rfl:   •  isosorbide mononitrate (IMDUR) 30 MG 24 hr tablet, TAKE 1 TABLET BY MOUTH EVERY DAY, Disp: 90 tablet, Rfl: 2  •  labetalol (NORMODYNE)  200 MG tablet, TAKE 1 TABLET BY MOUTH EVERY 12 HOURS, Disp: 180 tablet, Rfl: 2  •  lansoprazole (PREVACID) 30 MG capsule, Take 30 mg by mouth Daily., Disp: , Rfl:   •  latanoprost (XALATAN) 0.005 % ophthalmic solution, Administer 1 drop to both eyes Every Night., Disp: 7.5 mL, Rfl: 3  •  levothyroxine (SYNTHROID, LEVOTHROID) 100 MCG tablet, Take 100 mcg by mouth Daily., Disp: , Rfl:   •  Multiple Vitamins-Minerals (MULTIVITAMIN ADULT PO), Take  by mouth., Disp: , Rfl:   •  nitroglycerin (NITROSTAT) 0.4 MG SL tablet, 1 under the tongue as needed for angina, may repeat q5mins for up three doses, Disp: 30 tablet, Rfl: 3  •  olmesartan (BENICAR) 20 MG tablet, Take 2 tablets by mouth Daily., Disp: 30 tablet, Rfl: 6  •  Omega-3 Fatty Acids (FISH OIL) 1000 MG capsule capsule, Take  by mouth 2 (Two) Times a Day With Meals., Disp: , Rfl:   •  rosuvastatin (CRESTOR) 10 MG tablet, TAKE 1 TABLET BY MOUTH EVERY DAY, Disp: 90 tablet, Rfl: 2  •  spironolactone (ALDACTONE) 25 MG tablet, Take 1 tablet by mouth Daily., Disp: 90 tablet, Rfl: 2  •  Sucralfate (CARAFATE PO), Take  by mouth As Needed., Disp: , Rfl:   •  vitamin C (ASCORBIC ACID) 500 MG tablet, Take 500 mg by mouth Daily., Disp: , Rfl:   •  clonazePAM (KlonoPIN) 1 MG tablet, Take 1 mg by mouth At Night As Needed for seizures., Disp: , Rfl:   •  minocycline (MINOCIN,DYNACIN) 100 MG capsule, Take 100 mg by mouth Daily., Disp: , Rfl:     Past Surgical History:   Procedure Laterality Date   • ANKLE SURGERY Right 08/14/1971    ORIF of trimalleolar fracture, right ankle   • CARDIAC CATHETERIZATION  08/06/1991    chest pain etiology undetermined. Hypertension   • CARPAL TUNNEL RELEASE  02/16/2012    Endoscopic carpal tunnel release. left wrist   • CHOLECYSTECTOMY  09/26/2001    biliary colic   • COLONOSCOPY  11/25/1997    Mild diverticulosis of sigmoid colo w/ no evidence of diverticulitis. Internal hemorrhoidsHematochezia suspected related to internal hemorrhoids   • CORONARY  STENT PLACEMENT     • DILATATION AND CURETTAGE  01/06/1984    dysfunctional uterine bleeding   • EXCISION LESION  07/18/2002    Shave excision, right superior earlobe. Intradermal nevus, symptomatic, right superior earlobe   • HAND SURGERY  12/18/1971    excision of ganglion cyst, right palm.   • HYSTERECTOMY  11/20/1992    laparoscopically assisted vaginal hysterectomy & RSO. Pelvic pain, dyspareunia, menorrhagia, dysmenorrhea. Same plus? endometriosis of right ovary   • KNEE ARTHROSCOPY  12/11/2012    Arthroscopy, medial & lateral meniscectomy, partial posteriorly w/ chondroplasty of the patella. Torn medial meniscus right knee   • NAIL BED REMOVAL/REVISION  01/18/1996    Negative for exercise induced ischemia at a level of 87% of the maximum predicted HR. Abnormal blood pressure response to exercise. Functional Class I   • PAP SMEAR  05/02/1995    NORMAL   • REPLACEMENT TOTAL KNEE Left        Family History   Problem Relation Age of Onset   • Breast cancer Other    • Colon cancer Other    • Diabetes Other    • Heart disease Other    • Hypertension Other    • Thyroid disease Other    • Colon cancer Father    • Breast cancer Mother         Social History     Socioeconomic History   • Marital status:      Spouse name: Not on file   • Number of children: Not on file   • Years of education: Not on file   • Highest education level: Not on file   Tobacco Use   • Smoking status: Never Smoker   • Smokeless tobacco: Never Used   Substance and Sexual Activity   • Alcohol use: No   • Drug use: No   • Sexual activity: Never     Birth control/protection: Abstinence, Post-menopausal        Review of Systems   Constitutional: Negative.    HENT: Negative.    Eyes: Negative.    Respiratory: Negative.    Cardiovascular: Negative.    Gastrointestinal: Negative.    Endocrine: Negative.    Genitourinary: Negative.    Musculoskeletal:        Left shoulder pain   Skin: Negative.    Allergic/Immunologic: Negative.    Neurological:  "Negative.    Hematological: Negative.    Psychiatric/Behavioral: Negative.        PHYSICAL EXAMINATION:       Ht 162.6 cm (64\")   Wt 120 kg (265 lb)   BMI 45.49 kg/m²     Physical Exam   Constitutional: She is oriented to person, place, and time. She appears well-developed and well-nourished.  Non-toxic appearance. No distress.   HENT:   Head: Normocephalic.   Pulmonary/Chest: Effort normal. No respiratory distress.   Neurological: She is alert and oriented to person, place, and time.   Skin: Skin is warm and dry.   Psychiatric: She has a normal mood and affect. Her behavior is normal. Judgment and thought content normal.   Nursing note and vitals reviewed.      GAIT:     [x]  Normal  []  Antalgic    Assistive device: [x]  None  []  Walker     []  Crutches  []  Cane     []  Wheelchair  []  Stretcher    Left Shoulder Exam     Tenderness   The patient is experiencing tenderness in the biceps tendon.    Range of Motion   Active abduction: 120 (pain begins at 120)   Passive abduction: 130   Extension: 60   Forward flexion: 120   Internal rotation 90 degrees: 90     Tests   Drop arm: negative    Other   Erythema: absent  Sensation: normal               No results found.  MRI shoulder left without contrast7/13/2020  [facility]  Result Impression       1.  The long head of the biceps is dissected and/or partially torn; the tendon remains attached and is not avulsed.    2.  The supraspinatus tendon has interstitial tearing and superficial undersurface tear with the bulk of the tendon remaining attached.    [id number]   Result Narrative   Indication:  Pain in left shoulder since lifting injury 4 weeks ago.    MRI, Left Shoulder without Gadolinium:  No marrow edema or sign of bony injury.  The subscapularis is intact.  The long head of the biceps is not avulsed, but the tendon is either partially torn or dissected.    The supraspinatus tendon is not avulsed, but there is interstitial tearing and undersurface tear " with the bulk of the tendon remaining attached.  The glenohumeral joint is in satisfactory condition.  No impingement.   Other Result Information   Aristeo, Rad Results In - 07/13/2020  2:41 PM CDT  Indication:  Pain in left shoulder since lifting injury 4 weeks ago.    MRI, Left Shoulder without Gadolinium:  No marrow edema or sign of bony injury.  The subscapularis is intact.  The long head of the biceps is not avulsed, but the tendon is either partially torn or dissected.    The supraspinatus tendon is not avulsed, but there is interstitial tearing and undersurface tear with the bulk of the tendon remaining attached.  The glenohumeral joint is in satisfactory condition.  No impingement.    IMPRESSION:       1.  The long head of the biceps is dissected and/or partially torn; the tendon remains attached and is not avulsed.    2.  The supraspinatus tendon has interstitial tearing and superficial undersurface tear with the bulk of the tendon remaining attached.    [id number]     X-ray cervical spine complete (4-5views)6/17/2020  [facility]  Result Impression       1.  Probable mild spasm; otherwise negative.    2.  No left foraminal narrowing.    [id number]   Result Narrative   Indication:  Acute left shoulder and left arm pain.    C-spine, Five Views:  The C-spine has mild straightening of the usual lordosis.  The vertebral bodies and disc spaces are well-maintained.  No fracture or destruction.  The foramina are widely patent.   Other Result Information   Aristeo, Rad Results In - 06/17/2020  4:58 PM CDT  Indication:  Acute left shoulder and left arm pain.    C-spine, Five Views:  The C-spine has mild straightening of the usual lordosis.  The vertebral bodies and disc spaces are well-maintained.  No fracture or destruction.  The foramina are widely patent.    IMPRESSION:       1.  Probable mild spasm; otherwise negative.    2.  No left foraminal narrowing.    [id number]   Status Results Details      Unavailable   [facility]  Result Impression     Negative    [id number]   Result Narrative   Left shoulder pain for 2 weeks .    2 views left shoulder: The left shoulder is normally aligned and no fracture or other bony abnormality . The glenohumeral joint and AC joints appear normal .   Other Result Information   Aristeo, Rad Results In - 06/17/2020  2:30 PM CDT  Left shoulder pain for 2 weeks .    2 views left shoulder: The left shoulder is normally aligned and no fracture or other bony abnormality . The glenohumeral joint and AC joints appear normal .    IMPRESSION:     Negative    [id number]           ASSESSMENT:    Diagnoses and all orders for this visit:    Nontraumatic incomplete tear of left rotator cuff  -     Cancel: Ambulatory Referral to Physical Therapy Evaluate and treat; (as indicated ); Stretching, ROM, Strengthening  -     Ambulatory Referral to Physical Therapy Evaluate and treat; (as indicated ); Stretching, ROM, Strengthening    Acute pain of left shoulder    Other orders  -     Large Joint Arthrocentesis: L subacromial bursa      Large Joint Arthrocentesis: L subacromial bursa  Date/Time: 7/15/2020 2:09 PM  Consent given by: patient  Site marked: site marked  Timeout: Immediately prior to procedure a time out was called to verify the correct patient, procedure, equipment, support staff and site/side marked as required   Supporting Documentation  Indications: pain   Procedure Details  Location: shoulder - L subacromial bursa  Preparation: Patient was prepped and draped in the usual sterile fashion  Needle size: 22 G  Approach: posterior  Medications administered: 2 mL lidocaine 2%; 40 mg triamcinolone acetonide 40 MG/ML  Patient tolerance: patient tolerated the procedure well with no immediate complications            PLAN    X-ray and MRI results reviewed.  Patient reports seeing improvement in pain and range of motion since injury especially  after starting at home exercises.  However she is still experiencing significant pain with certain movements and she has not yet back to her baseline range of motion/strength.  Recommend formal physical therapy; patient seems to be a good candidate for this as she has had improvement with at home exercises.  Intra-articular steroid injection given today.  Patient to return in 4 to 6 weeks.      Return for return in 4-6 weeks .    Janessa Granados, APRN

## 2020-07-28 ENCOUNTER — LAB (OUTPATIENT)
Dept: LAB | Facility: HOSPITAL | Age: 65
End: 2020-07-28

## 2020-07-28 PROCEDURE — C9803 HOPD COVID-19 SPEC COLLECT: HCPCS

## 2020-07-28 PROCEDURE — C9803 HOPD COVID-19 SPEC COLLECT: HCPCS | Performed by: OPHTHALMOLOGY

## 2020-07-28 PROCEDURE — U0003 INFECTIOUS AGENT DETECTION BY NUCLEIC ACID (DNA OR RNA); SEVERE ACUTE RESPIRATORY SYNDROME CORONAVIRUS 2 (SARS-COV-2) (CORONAVIRUS DISEASE [COVID-19]), AMPLIFIED PROBE TECHNIQUE, MAKING USE OF HIGH THROUGHPUT TECHNOLOGIES AS DESCRIBED BY CMS-2020-01-R: HCPCS | Performed by: OPHTHALMOLOGY

## 2020-07-29 LAB
COVID LABCORP PRIORITY: NORMAL
SARS-COV-2 RNA RESP QL NAA+PROBE: NOT DETECTED

## 2020-07-31 ENCOUNTER — ANESTHESIA (OUTPATIENT)
Dept: PERIOP | Facility: HOSPITAL | Age: 65
End: 2020-07-31

## 2020-07-31 ENCOUNTER — ANESTHESIA EVENT (OUTPATIENT)
Dept: PERIOP | Facility: HOSPITAL | Age: 65
End: 2020-07-31

## 2020-07-31 ENCOUNTER — HOSPITAL ENCOUNTER (OUTPATIENT)
Facility: HOSPITAL | Age: 65
Setting detail: HOSPITAL OUTPATIENT SURGERY
Discharge: HOME | End: 2020-07-31
Attending: OPHTHALMOLOGY | Admitting: OPHTHALMOLOGY

## 2020-07-31 VITALS
RESPIRATION RATE: 18 BRPM | DIASTOLIC BLOOD PRESSURE: 73 MMHG | WEIGHT: 259.04 LBS | HEART RATE: 57 BPM | SYSTOLIC BLOOD PRESSURE: 153 MMHG | OXYGEN SATURATION: 98 % | BODY MASS INDEX: 45.9 KG/M2 | TEMPERATURE: 97.3 F | HEIGHT: 63 IN

## 2020-07-31 PROCEDURE — 25010000002 VANCOMYCIN 1 G RECONSTITUTED SOLUTION 1 EACH VIAL: Performed by: OPHTHALMOLOGY

## 2020-07-31 PROCEDURE — V2632 POST CHMBR INTRAOCULAR LENS: HCPCS | Performed by: OPHTHALMOLOGY

## 2020-07-31 PROCEDURE — 25010000002 MIDAZOLAM PER 1 MG: Performed by: NURSE ANESTHETIST, CERTIFIED REGISTERED

## 2020-07-31 PROCEDURE — 25010000002 EPINEPHRINE PER 0.1 MG: Performed by: OPHTHALMOLOGY

## 2020-07-31 DEVICE — LENS ACRYSOF IQ 6X13MM SN60WF 19.0: Type: IMPLANTABLE DEVICE | Site: EYE | Status: FUNCTIONAL

## 2020-07-31 RX ORDER — MOXIFLOXACIN 5 MG/ML
SOLUTION/ DROPS OPHTHALMIC AS NEEDED
Status: DISCONTINUED | OUTPATIENT
Start: 2020-07-31 | End: 2020-07-31 | Stop reason: HOSPADM

## 2020-07-31 RX ORDER — TETRACAINE HYDROCHLORIDE 5 MG/ML
SOLUTION OPHTHALMIC AS NEEDED
Status: DISCONTINUED | OUTPATIENT
Start: 2020-07-31 | End: 2020-07-31 | Stop reason: HOSPADM

## 2020-07-31 RX ORDER — SODIUM CHLORIDE 0.9 % (FLUSH) 0.9 %
10 SYRINGE (ML) INJECTION AS NEEDED
Status: DISCONTINUED | OUTPATIENT
Start: 2020-07-31 | End: 2020-07-31 | Stop reason: HOSPADM

## 2020-07-31 RX ORDER — TETRACAINE HYDROCHLORIDE 5 MG/ML
1 SOLUTION OPHTHALMIC
Status: COMPLETED | OUTPATIENT
Start: 2020-07-31 | End: 2020-07-31

## 2020-07-31 RX ORDER — MIDAZOLAM HYDROCHLORIDE 1 MG/ML
INJECTION INTRAMUSCULAR; INTRAVENOUS AS NEEDED
Status: DISCONTINUED | OUTPATIENT
Start: 2020-07-31 | End: 2020-07-31 | Stop reason: SURG

## 2020-07-31 RX ORDER — KETAMINE HYDROCHLORIDE 100 MG/ML
INJECTION INTRAMUSCULAR; INTRAVENOUS AS NEEDED
Status: DISCONTINUED | OUTPATIENT
Start: 2020-07-31 | End: 2020-07-31 | Stop reason: SURG

## 2020-07-31 RX ORDER — BRIMONIDINE TARTRATE 0.15 %
DROPS OPHTHALMIC (EYE) AS NEEDED
Status: DISCONTINUED | OUTPATIENT
Start: 2020-07-31 | End: 2020-07-31 | Stop reason: HOSPADM

## 2020-07-31 RX ORDER — PREDNISOLONE ACETATE 10 MG/ML
SUSPENSION/ DROPS OPHTHALMIC AS NEEDED
Status: DISCONTINUED | OUTPATIENT
Start: 2020-07-31 | End: 2020-07-31 | Stop reason: HOSPADM

## 2020-07-31 RX ORDER — CYCLOPENTOLATE HYDROCHLORIDE 10 MG/ML
1 SOLUTION/ DROPS OPHTHALMIC
Status: COMPLETED | OUTPATIENT
Start: 2020-07-31 | End: 2020-07-31

## 2020-07-31 RX ORDER — PHENYLEPHRINE HCL 2.5 %
1 DROPS OPHTHALMIC (EYE)
Status: COMPLETED | OUTPATIENT
Start: 2020-07-31 | End: 2020-07-31

## 2020-07-31 RX ADMIN — PHENYLEPHRINE HYDROCHLORIDE 1 DROP: 25 SOLUTION/ DROPS OPHTHALMIC at 07:17

## 2020-07-31 RX ADMIN — CYCLOPENTOLATE HYDROCHLORIDE 1 DROP: 10 SOLUTION/ DROPS OPHTHALMIC at 07:28

## 2020-07-31 RX ADMIN — MIDAZOLAM HYDROCHLORIDE 1 MG: 2 INJECTION, SOLUTION INTRAMUSCULAR; INTRAVENOUS at 09:11

## 2020-07-31 RX ADMIN — PHENYLEPHRINE HYDROCHLORIDE 1 DROP: 25 SOLUTION/ DROPS OPHTHALMIC at 07:07

## 2020-07-31 RX ADMIN — CYCLOPENTOLATE HYDROCHLORIDE 1 DROP: 10 SOLUTION/ DROPS OPHTHALMIC at 07:07

## 2020-07-31 RX ADMIN — KETAMINE HYDROCHLORIDE 5 MG: 100 INJECTION INTRAMUSCULAR; INTRAVENOUS at 09:11

## 2020-07-31 RX ADMIN — SODIUM CHLORIDE, PRESERVATIVE FREE 10 ML: 5 INJECTION INTRAVENOUS at 07:23

## 2020-07-31 RX ADMIN — TETRACAINE HYDROCHLORIDE 1 DROP: 5 SOLUTION OPHTHALMIC at 07:28

## 2020-07-31 RX ADMIN — TETRACAINE HYDROCHLORIDE 1 DROP: 5 SOLUTION OPHTHALMIC at 07:07

## 2020-07-31 RX ADMIN — PHENYLEPHRINE HYDROCHLORIDE 1 DROP: 25 SOLUTION/ DROPS OPHTHALMIC at 07:28

## 2020-07-31 RX ADMIN — CYCLOPENTOLATE HYDROCHLORIDE 1 DROP: 10 SOLUTION/ DROPS OPHTHALMIC at 07:17

## 2020-07-31 NOTE — ANESTHESIA POSTPROCEDURE EVALUATION
Patient: Aleksandra Joseph    Procedure Summary     Date:  07/31/20 Room / Location:  [facility] OR 06 / [facility] OR    Anesthesia Start:  0906 Anesthesia Stop:  0917    Procedure:  REMOVE CATARACT AND IMPLANT INTRAOCULAR LENS (Left Eye) Diagnosis:       Age-related nuclear cataract of left eye      (age-related nuclear cataract of left eye)    Surgeon:  Timothy Portillo MD Provider:  Joseph Peña MD    Anesthesia Type:  MAC ASA Status:  3          Anesthesia Type: MAC    Vitals  No vitals data found for the desired time range.          Post Anesthesia Care and Evaluation    Patient location during evaluation: bedside  Patient participation: complete - patient participated  Level of consciousness: awake  Pain score: 1  Pain management: adequate  Airway patency: patent  Anesthetic complications: No anesthetic complications  PONV Status: noneRespiratory status: acceptable  Hydration status: acceptable  Post Neuraxial Block status: Motor and sensory function returned to baseline

## 2020-07-31 NOTE — ANESTHESIA POSTPROCEDURE EVALUATION
Patient: Aleksandra Joseph    Procedure Summary     Date:  07/31/20 Room / Location:  [facility] OR 06 / [facility] OR    Anesthesia Start:  0906 Anesthesia Stop:  0917    Procedure:  REMOVE CATARACT AND IMPLANT INTRAOCULAR LENS (Left Eye) Diagnosis:       Age-related nuclear cataract of left eye      (age-related nuclear cataract of left eye)    Surgeon:  Timothy Portillo MD Provider:  Joseph Peña MD    Anesthesia Type:  MAC ASA Status:  3          Anesthesia Type: MAC    Vitals  No vitals data found for the desired time range.          Post Anesthesia Care and Evaluation    Patient location during evaluation: bedside  Patient participation: complete - patient participated  Level of consciousness: awake  Pain score: 1  Pain management: adequate  Airway patency: patent  Anesthetic complications: No anesthetic complications  PONV Status: none  Cardiovascular status: acceptable  Respiratory status: acceptable  Hydration status: acceptable  Post Neuraxial Block status: Motor and sensory function returned to baseline

## 2020-07-31 NOTE — ANESTHESIA PREPROCEDURE EVALUATION
Anesthesia Evaluation     no history of anesthetic complications:  NPO Solid Status: > 8 hours  NPO Liquid Status: > 2 hours           Airway   Mallampati: II  TM distance: >3 FB  Neck ROM: full  Possible difficult intubation  Dental - normal exam     Pulmonary - normal exam    breath sounds clear to auscultation  (-) COPD, asthma, sleep apnea, not a smoker    ROS comment: snores  Cardiovascular - normal exam  Exercise tolerance: poor (<4 METS)    PT is on anticoagulation therapy  Patient on routine beta blocker and Beta blocker given within 24 hours of surgery  Rhythm: regular  Rate: normal    (+) hypertension poorly controlled 2 medications or greater, valvular problems/murmurs murmur, CAD, cardiac stents more than 12 months ago hyperlipidemia,   (-) past MI, dysrhythmias, angina, DVT      Neuro/Psych  (-) seizures, TIA, CVA, headaches, numbness, psychiatric history  GI/Hepatic/Renal/Endo    (+) morbid obesity, GERD well controlled,  thyroid problem hypothyroidism  (-) hepatitis, liver disease, no renal disease, diabetes    Musculoskeletal     (+) arthralgias,   Abdominal   (+) obese,    Substance History   (-) alcohol use, drug use     OB/GYN          Other   arthritis (knee replacement),      (-) history of cancer  ROS/Med Hx Other: 1st eye  Off plavix for 2 days                  Anesthesia Plan    ASA 3     MAC     intravenous induction     Anesthetic plan, all risks, benefits, and alternatives have been provided, discussed and informed consent has been obtained with: patient.

## 2020-08-04 ENCOUNTER — LAB (OUTPATIENT)
Dept: LAB | Facility: HOSPITAL | Age: 65
End: 2020-08-04

## 2020-08-04 DIAGNOSIS — Z01.818 PRE-OP TESTING: Primary | ICD-10-CM

## 2020-08-04 PROCEDURE — C9803 HOPD COVID-19 SPEC COLLECT: HCPCS

## 2020-08-04 PROCEDURE — U0003 INFECTIOUS AGENT DETECTION BY NUCLEIC ACID (DNA OR RNA); SEVERE ACUTE RESPIRATORY SYNDROME CORONAVIRUS 2 (SARS-COV-2) (CORONAVIRUS DISEASE [COVID-19]), AMPLIFIED PROBE TECHNIQUE, MAKING USE OF HIGH THROUGHPUT TECHNOLOGIES AS DESCRIBED BY CMS-2020-01-R: HCPCS

## 2020-08-05 LAB
COVID LABCORP PRIORITY: NORMAL
SARS-COV-2 RNA RESP QL NAA+PROBE: NOT DETECTED

## 2020-08-07 ENCOUNTER — ANESTHESIA EVENT (OUTPATIENT)
Dept: PERIOP | Facility: HOSPITAL | Age: 65
End: 2020-08-07

## 2020-08-07 ENCOUNTER — HOSPITAL ENCOUNTER (OUTPATIENT)
Facility: HOSPITAL | Age: 65
Setting detail: HOSPITAL OUTPATIENT SURGERY
Discharge: HOME | End: 2020-08-07
Attending: OPHTHALMOLOGY | Admitting: OPHTHALMOLOGY

## 2020-08-07 ENCOUNTER — ANESTHESIA (OUTPATIENT)
Dept: PERIOP | Facility: HOSPITAL | Age: 65
End: 2020-08-07

## 2020-08-07 VITALS
HEART RATE: 95 BPM | OXYGEN SATURATION: 98 % | DIASTOLIC BLOOD PRESSURE: 76 MMHG | TEMPERATURE: 97.2 F | RESPIRATION RATE: 18 BRPM | BODY MASS INDEX: 45.59 KG/M2 | HEIGHT: 63 IN | SYSTOLIC BLOOD PRESSURE: 134 MMHG | WEIGHT: 257.28 LBS

## 2020-08-07 PROCEDURE — 25010000002 FENTANYL CITRATE (PF) 100 MCG/2ML SOLUTION: Performed by: NURSE ANESTHETIST, CERTIFIED REGISTERED

## 2020-08-07 PROCEDURE — 25010000002 MIDAZOLAM PER 1 MG: Performed by: NURSE ANESTHETIST, CERTIFIED REGISTERED

## 2020-08-07 PROCEDURE — 25010000002 VANCOMYCIN 1 G RECONSTITUTED SOLUTION 1 EACH VIAL: Performed by: OPHTHALMOLOGY

## 2020-08-07 PROCEDURE — 25010000002 EPINEPHRINE PER 0.1 MG: Performed by: OPHTHALMOLOGY

## 2020-08-07 PROCEDURE — V2632 POST CHMBR INTRAOCULAR LENS: HCPCS | Performed by: OPHTHALMOLOGY

## 2020-08-07 DEVICE — LENS ACRYSOF IQ 6X13MM SN60WF 21.0: Type: IMPLANTABLE DEVICE | Site: EYE | Status: FUNCTIONAL

## 2020-08-07 RX ORDER — CYCLOPENTOLATE HYDROCHLORIDE 10 MG/ML
1 SOLUTION/ DROPS OPHTHALMIC
Status: COMPLETED | OUTPATIENT
Start: 2020-08-07 | End: 2020-08-07

## 2020-08-07 RX ORDER — SODIUM CHLORIDE 0.9 % (FLUSH) 0.9 %
10 SYRINGE (ML) INJECTION AS NEEDED
Status: DISCONTINUED | OUTPATIENT
Start: 2020-08-07 | End: 2020-08-07 | Stop reason: HOSPADM

## 2020-08-07 RX ORDER — PROMETHAZINE HYDROCHLORIDE 25 MG/ML
12.5 INJECTION, SOLUTION INTRAMUSCULAR; INTRAVENOUS ONCE AS NEEDED
Status: CANCELLED | OUTPATIENT
Start: 2020-08-07

## 2020-08-07 RX ORDER — BRIMONIDINE TARTRATE 0.15 %
DROPS OPHTHALMIC (EYE) AS NEEDED
Status: DISCONTINUED | OUTPATIENT
Start: 2020-08-07 | End: 2020-08-07 | Stop reason: HOSPADM

## 2020-08-07 RX ORDER — MEPERIDINE HYDROCHLORIDE 25 MG/ML
12.5 INJECTION INTRAMUSCULAR; INTRAVENOUS; SUBCUTANEOUS
Status: CANCELLED | OUTPATIENT
Start: 2020-08-07 | End: 2020-08-08

## 2020-08-07 RX ORDER — PROMETHAZINE HYDROCHLORIDE 25 MG/1
25 SUPPOSITORY RECTAL ONCE AS NEEDED
Status: CANCELLED | OUTPATIENT
Start: 2020-08-07

## 2020-08-07 RX ORDER — TETRACAINE HYDROCHLORIDE 5 MG/ML
1 SOLUTION OPHTHALMIC AS NEEDED
Status: COMPLETED | OUTPATIENT
Start: 2020-08-07 | End: 2020-08-07

## 2020-08-07 RX ORDER — FENTANYL CITRATE 50 UG/ML
INJECTION, SOLUTION INTRAMUSCULAR; INTRAVENOUS AS NEEDED
Status: DISCONTINUED | OUTPATIENT
Start: 2020-08-07 | End: 2020-08-07 | Stop reason: SURG

## 2020-08-07 RX ORDER — MOXIFLOXACIN 5 MG/ML
SOLUTION/ DROPS OPHTHALMIC AS NEEDED
Status: DISCONTINUED | OUTPATIENT
Start: 2020-08-07 | End: 2020-08-07 | Stop reason: HOSPADM

## 2020-08-07 RX ORDER — PREDNISOLONE ACETATE 10 MG/ML
SUSPENSION/ DROPS OPHTHALMIC AS NEEDED
Status: DISCONTINUED | OUTPATIENT
Start: 2020-08-07 | End: 2020-08-07 | Stop reason: HOSPADM

## 2020-08-07 RX ORDER — PHENYLEPHRINE HCL 2.5 %
1 DROPS OPHTHALMIC (EYE)
Status: COMPLETED | OUTPATIENT
Start: 2020-08-07 | End: 2020-08-07

## 2020-08-07 RX ORDER — ONDANSETRON 2 MG/ML
4 INJECTION INTRAMUSCULAR; INTRAVENOUS ONCE AS NEEDED
Status: CANCELLED | OUTPATIENT
Start: 2020-08-07

## 2020-08-07 RX ORDER — PROMETHAZINE HYDROCHLORIDE 25 MG/1
25 TABLET ORAL ONCE AS NEEDED
Status: CANCELLED | OUTPATIENT
Start: 2020-08-07

## 2020-08-07 RX ORDER — MIDAZOLAM HYDROCHLORIDE 1 MG/ML
INJECTION INTRAMUSCULAR; INTRAVENOUS AS NEEDED
Status: DISCONTINUED | OUTPATIENT
Start: 2020-08-07 | End: 2020-08-07 | Stop reason: SURG

## 2020-08-07 RX ORDER — TETRACAINE HYDROCHLORIDE 5 MG/ML
SOLUTION OPHTHALMIC AS NEEDED
Status: DISCONTINUED | OUTPATIENT
Start: 2020-08-07 | End: 2020-08-07 | Stop reason: HOSPADM

## 2020-08-07 RX ADMIN — FENTANYL CITRATE 50 MCG: 50 INJECTION, SOLUTION INTRAMUSCULAR; INTRAVENOUS at 10:02

## 2020-08-07 RX ADMIN — GLYCOPYRROLATE 400 MCG: 0.2 INJECTION, SOLUTION INTRAMUSCULAR; INTRAVITREAL at 10:05

## 2020-08-07 RX ADMIN — CYCLOPENTOLATE HYDROCHLORIDE 1 DROP: 10 SOLUTION/ DROPS OPHTHALMIC at 08:12

## 2020-08-07 RX ADMIN — PHENYLEPHRINE HYDROCHLORIDE 1 DROP: 25 SOLUTION/ DROPS OPHTHALMIC at 08:12

## 2020-08-07 RX ADMIN — CYCLOPENTOLATE HYDROCHLORIDE 1 DROP: 10 SOLUTION/ DROPS OPHTHALMIC at 08:32

## 2020-08-07 RX ADMIN — GLYCOPYRROLATE 200 MCG: 0.2 INJECTION, SOLUTION INTRAMUSCULAR; INTRAVITREAL at 10:02

## 2020-08-07 RX ADMIN — Medication 10 ML: at 08:25

## 2020-08-07 RX ADMIN — TETRACAINE HYDROCHLORIDE 1 DROP: 5 SOLUTION OPHTHALMIC at 08:12

## 2020-08-07 RX ADMIN — PHENYLEPHRINE HYDROCHLORIDE 1 DROP: 25 SOLUTION/ DROPS OPHTHALMIC at 08:22

## 2020-08-07 RX ADMIN — MIDAZOLAM HYDROCHLORIDE 1 MG: 2 INJECTION, SOLUTION INTRAMUSCULAR; INTRAVENOUS at 10:02

## 2020-08-07 RX ADMIN — CYCLOPENTOLATE HYDROCHLORIDE 1 DROP: 10 SOLUTION/ DROPS OPHTHALMIC at 08:22

## 2020-08-07 RX ADMIN — PHENYLEPHRINE HYDROCHLORIDE 1 DROP: 25 SOLUTION/ DROPS OPHTHALMIC at 08:32

## 2020-08-07 RX ADMIN — TETRACAINE HYDROCHLORIDE 1 DROP: 5 SOLUTION OPHTHALMIC at 08:32

## 2020-08-07 NOTE — ANESTHESIA PREPROCEDURE EVALUATION
Anesthesia Evaluation     no history of anesthetic complications:  NPO Solid Status: > 8 hours  NPO Liquid Status: > 2 hours           Airway   Mallampati: II  TM distance: >3 FB  Neck ROM: full  Possible difficult intubation  Dental    (+) poor dentition    Pulmonary - normal exam    breath sounds clear to auscultation  (-) COPD, asthma, shortness of breath, sleep apnea, not a smoker    ROS comment: snores  Cardiovascular - normal exam  Exercise tolerance: poor (<4 METS)    NYHA Classification: III  ECG reviewed  PT is on anticoagulation therapy  Patient on routine beta blocker and Beta blocker given within 24 hours of surgery  Rhythm: regular  Rate: normal    (+) hypertension poorly controlled 2 medications or greater, valvular problems/murmurs murmur, CAD, cardiac stents more than 12 months ago hyperlipidemia,   (-) past MI, dysrhythmias, angina, DVT      Neuro/Psych  (-) seizures, TIA, CVA, headaches, numbness, psychiatric history  GI/Hepatic/Renal/Endo    (+) morbid obesity, GERD well controlled,  thyroid problem hypothyroidism  (-) hepatitis, liver disease, no renal disease, diabetes    Musculoskeletal     (+) arthralgias,   Abdominal   (+) obese,    Substance History   (-) alcohol use, drug use     OB/GYN          Other   arthritis (knee replacement),      (-) history of cancer  ROS/Med Hx Other: 1st eye  Off plavix for 2 days                    Anesthesia Plan    ASA 3     MAC     intravenous induction     Anesthetic plan, all risks, benefits, and alternatives have been provided, discussed and informed consent has been obtained with: patient.

## 2020-08-07 NOTE — ANESTHESIA POSTPROCEDURE EVALUATION
Patient: Aleksandra Joseph    Procedure Summary     Date:  08/07/20 Room / Location:  [facility] OR 06 / [facility] OR    Anesthesia Start:  0959 Anesthesia Stop:  1011    Procedure:  REMOVE CATARACT AND IMPLANT INTRAOCULAR LENS (Right Eye) Diagnosis:      Surgeon:  Timothy Portillo MD Provider:  Jad Pena MD    Anesthesia Type:  MAC ASA Status:  3          Anesthesia Type: MAC    Vitals  No vitals data found for the desired time range.          Post Anesthesia Care and Evaluation    Patient location during evaluation: bedside  Patient participation: complete - patient cannot participate  Level of consciousness: awake  Pain score: 0  Pain management: adequate  Airway patency: patent  Anesthetic complications: No anesthetic complications  PONV Status: none  Cardiovascular status: acceptable  Respiratory status: acceptable  Hydration status: acceptable

## 2020-09-01 RX ORDER — CLOPIDOGREL BISULFATE 75 MG/1
TABLET ORAL
Qty: 90 TABLET | Refills: 3 | Status: SHIPPED | OUTPATIENT
Start: 2020-09-01 | End: 2021-11-22 | Stop reason: SDUPTHER

## 2020-09-13 ENCOUNTER — LAB (OUTPATIENT)
Dept: LAB | Facility: HOSPITAL | Age: 65
End: 2020-09-13

## 2020-09-13 LAB — SARS-COV-2 N GENE RESP QL NAA+PROBE: NOT DETECTED

## 2020-09-13 PROCEDURE — C9803 HOPD COVID-19 SPEC COLLECT: HCPCS | Performed by: INTERNAL MEDICINE

## 2020-09-13 PROCEDURE — 87635 SARS-COV-2 COVID-19 AMP PRB: CPT | Performed by: INTERNAL MEDICINE

## 2020-09-13 PROCEDURE — C9803 HOPD COVID-19 SPEC COLLECT: HCPCS

## 2020-09-16 ENCOUNTER — ANESTHESIA EVENT (OUTPATIENT)
Dept: GASTROENTEROLOGY | Facility: HOSPITAL | Age: 65
End: 2020-09-16

## 2020-09-16 ENCOUNTER — HOSPITAL ENCOUNTER (OUTPATIENT)
Facility: HOSPITAL | Age: 65
Setting detail: HOSPITAL OUTPATIENT SURGERY
Discharge: HOME | End: 2020-09-16
Attending: INTERNAL MEDICINE | Admitting: INTERNAL MEDICINE

## 2020-09-16 ENCOUNTER — ANESTHESIA (OUTPATIENT)
Dept: GASTROENTEROLOGY | Facility: HOSPITAL | Age: 65
End: 2020-09-16

## 2020-09-16 VITALS
HEIGHT: 64 IN | BODY MASS INDEX: 44.16 KG/M2 | HEART RATE: 62 BPM | SYSTOLIC BLOOD PRESSURE: 108 MMHG | OXYGEN SATURATION: 100 % | TEMPERATURE: 97.2 F | RESPIRATION RATE: 16 BRPM | DIASTOLIC BLOOD PRESSURE: 54 MMHG

## 2020-09-16 DIAGNOSIS — Z80.0 FH: GI CANCER: ICD-10-CM

## 2020-09-16 PROCEDURE — 25010000002 PROPOFOL 10 MG/ML EMULSION: Performed by: NURSE ANESTHETIST, CERTIFIED REGISTERED

## 2020-09-16 RX ORDER — PROPOFOL 10 MG/ML
VIAL (ML) INTRAVENOUS AS NEEDED
Status: DISCONTINUED | OUTPATIENT
Start: 2020-09-16 | End: 2020-09-16 | Stop reason: SURG

## 2020-09-16 RX ORDER — LIDOCAINE HYDROCHLORIDE 20 MG/ML
INJECTION, SOLUTION EPIDURAL; INFILTRATION; INTRACAUDAL; PERINEURAL AS NEEDED
Status: DISCONTINUED | OUTPATIENT
Start: 2020-09-16 | End: 2020-09-16 | Stop reason: SURG

## 2020-09-16 RX ORDER — DEXTROSE AND SODIUM CHLORIDE 5; .45 G/100ML; G/100ML
30 INJECTION, SOLUTION INTRAVENOUS CONTINUOUS PRN
Status: DISCONTINUED | OUTPATIENT
Start: 2020-09-16 | End: 2020-09-16 | Stop reason: HOSPADM

## 2020-09-16 RX ADMIN — LIDOCAINE HYDROCHLORIDE 100 MG: 20 INJECTION, SOLUTION EPIDURAL; INFILTRATION; INTRACAUDAL; PERINEURAL at 15:07

## 2020-09-16 RX ADMIN — DEXTROSE AND SODIUM CHLORIDE 30 ML/HR: 5; 450 INJECTION, SOLUTION INTRAVENOUS at 15:00

## 2020-09-16 RX ADMIN — PROPOFOL 140 MG: 10 INJECTION, EMULSION INTRAVENOUS at 15:07

## 2020-09-16 RX ADMIN — PROPOFOL 20 MG: 10 INJECTION, EMULSION INTRAVENOUS at 15:13

## 2020-09-16 RX ADMIN — PROPOFOL 40 MG: 10 INJECTION, EMULSION INTRAVENOUS at 15:10

## 2020-09-16 RX ADMIN — PROPOFOL 30 MG: 10 INJECTION, EMULSION INTRAVENOUS at 15:16

## 2020-09-16 RX ADMIN — PROPOFOL 30 MG: 10 INJECTION, EMULSION INTRAVENOUS at 15:11

## 2020-09-16 RX ADMIN — PROPOFOL 20 MG: 10 INJECTION, EMULSION INTRAVENOUS at 15:08

## 2020-09-16 RX ADMIN — PROPOFOL 20 MG: 10 INJECTION, EMULSION INTRAVENOUS at 15:09

## 2020-09-16 NOTE — H&P
Jyoti Bennett DO,[facility]  Gastroenterology  Hepatology  Endoscopy  Board Certified in Internal Medicine and gastroenterology  [address], [address]  [place]. [address]  - [phone]   F - [phone]     GASTROENTEROLOGY HISTORY AND PHYSICAL  NOTE   JYOTI BENNETT DO.         SUBJECTIVE:   9/16/2020    Name: Aleksandra Joseph  DOD: 1955      Chief Complaint:       Subjective : Screen for colon cancer.    Patient is 65 y.o. female presents with desire for elective colonoscopy.      ROS/HISTORY/ CURRENT MEDICATIONS/OBJECTIVE/VS/PE:   Review of Systems:  All systems unremarkable unless specified below.  Constitutional   HENT  Eyes   Respiratory    Cardiovascular  Gastrointestinal   Endocrine  Genitourinary    Musculoskeletal   Skin  Allergic/Immunologic    Neurological    Hematological  Psychiatric/Behavioral    History:     Past Medical History:   Diagnosis Date   • Allergic conjunctivitis    • Borderline glaucoma    • Carpal tunnel syndrome    • Disease of thyroid gland    • Diverticulitis of colon    • Drusen of optic disc    • Hemorrhoids    • Hyperlipidemia    • Hypertension    • Ingrowing nail    • Vitreous detachment of right eye      Past Surgical History:   Procedure Laterality Date   • ANKLE SURGERY Right 08/14/1971    ORIF of trimalleolar fracture, right ankle   • CARDIAC CATHETERIZATION  08/06/1991    chest pain etiology undetermined. Hypertension   • CARPAL TUNNEL RELEASE  02/16/2012    Endoscopic carpal tunnel release. left wrist   • CATARACT EXTRACTION W/ INTRAOCULAR LENS IMPLANT Left 7/31/2020    Procedure: REMOVE CATARACT AND IMPLANT INTRAOCULAR LENS;  Surgeon: Timothy Portillo MD;  Location: [facility];  Service: Ophthalmology;  Laterality: Left;   • CATARACT EXTRACTION W/ INTRAOCULAR LENS IMPLANT Right 8/7/2020    Procedure: REMOVE CATARACT AND IMPLANT INTRAOCULAR LENS;  Surgeon: Timothy Portillo MD;  Location: [facility];  Service: Ophthalmology;   Laterality: Right;   • CHOLECYSTECTOMY  09/26/2001    biliary colic   • COLONOSCOPY  11/25/1997    Mild diverticulosis of sigmoid colo w/ no evidence of diverticulitis. Internal hemorrhoidsHematochezia suspected related to internal hemorrhoids   • CORONARY STENT PLACEMENT     • DILATATION AND CURETTAGE  01/06/1984    dysfunctional uterine bleeding   • EXCISION LESION  07/18/2002    Shave excision, right superior earlobe. Intradermal nevus, symptomatic, right superior earlobe   • HAND SURGERY  12/18/1971    excision of ganglion cyst, right palm.   • HYSTERECTOMY  11/20/1992    laparoscopically assisted vaginal hysterectomy & RSO. Pelvic pain, dyspareunia, menorrhagia, dysmenorrhea. Same plus? endometriosis of right ovary   • KNEE ARTHROSCOPY  12/11/2012    Arthroscopy, medial & lateral meniscectomy, partial posteriorly w/ chondroplasty of the patella. Torn medial meniscus right knee   • NAIL BED REMOVAL/REVISION  01/18/1996    Negative for exercise induced ischemia at a level of 87% of the maximum predicted HR. Abnormal blood pressure response to exercise. Functional Class I   • PAP SMEAR  05/02/1995    NORMAL   • REPLACEMENT TOTAL KNEE Left      Family History   Problem Relation Age of Onset   • Breast cancer Other    • Colon cancer Other    • Diabetes Other    • Heart disease Other    • Hypertension Other    • Thyroid disease Other    • Colon cancer Father    • Breast cancer Mother      Social History     Tobacco Use   • Smoking status: Never Smoker   • Smokeless tobacco: Never Used   Substance Use Topics   • Alcohol use: No   • Drug use: No     Prior to Admission medications    Medication Sig Start Date End Date Taking? Authorizing Provider   aspirin 81 MG EC tablet Take 81 mg by mouth Daily.    ProviderJeffry MD   Calcium Carbonate-Vitamin D (CALCIUM 500 + D PO) Take  by mouth.    ProviderJeffry MD   CloNIDine (CATAPRES) 0.1 MG tablet TAKE 1 TABLET BY MOUTH EVERY 12 HOURS  Patient taking  differently: Take 0.1 mg by mouth 2 (Two) Times a Day. 8/28/19   Joshua Ernandez MD   clopidogrel (PLAVIX) 75 MG tablet TAKE 1 TABLET BY MOUTH EVERY DAY 9/1/20   Joshua Ernandez MD   Coenzyme Q10 (CO Q-10) 400 MG capsule Take 1 tablet by mouth Daily.    Jeffry Jackman MD   dicyclomine (BENTYL) 10 MG capsule Take 10 mg by mouth As Needed.    Jeffry Jackman MD   doxycycline (VIBRAMYCIN) 100 MG capsule Take 100 mg by mouth Daily. 7/19/19   Jeffry Jackman MD   estradiol (ESTRACE) 1 MG tablet Take 1 tablet by mouth Daily. 3/12/20   Charley Pabon APRN   furosemide (LASIX) 20 MG tablet TAKE 1 TABLET BY MOUTH EVERY DAY  Patient taking differently: Take 20 mg by mouth Daily. 3/9/20   Keena Urban MD   Glucosamine-Chondroitin (OSTEO BI-FLEX REGULAR STRENGTH PO) Take  by mouth.    Jeffry Jackman MD   isosorbide mononitrate (IMDUR) 30 MG 24 hr tablet TAKE 1 TABLET BY MOUTH EVERY DAY  Patient taking differently: Take 30 mg by mouth Daily. Do not crush, or chew. 3/9/20   Keena Urban MD   labetalol (NORMODYNE) 200 MG tablet TAKE 1 TABLET BY MOUTH EVERY 12 HOURS  Patient taking differently: Take 200 mg by mouth 2 (Two) Times a Day. 3/9/20   Keena Urban MD   lansoprazole (PREVACID) 30 MG capsule Take 30 mg by mouth Daily.    Jeffry Jackman MD   latanoprost (XALATAN) 0.005 % ophthalmic solution Administer 1 drop to both eyes Every Night. 3/8/17   Jacinto Mckee MD   levothyroxine (SYNTHROID, LEVOTHROID) 100 MCG tablet Take 100 mcg by mouth Daily.    Jeffry Jackman MD   Multiple Vitamins-Minerals (MULTIVITAMIN ADULT PO) Take  by mouth.    Jeffry Jackman MD   nitroglycerin (NITROSTAT) 0.4 MG SL tablet 1 under the tongue as needed for angina, may repeat q5mins for up three doses 6/6/19   Joshua Ernandez MD   olmesartan (BENICAR) 20 MG tablet Take 2 tablets by mouth Daily. 1/25/17   Joshua Ernandez MD   Omega-3  "Fatty Acids (FISH OIL) 1000 MG capsule capsule Take  by mouth 2 (Two) Times a Day With Meals.    Provider, MD Jeffry   rosuvastatin (CRESTOR) 10 MG tablet TAKE 1 TABLET BY MOUTH EVERY DAY  Patient taking differently: Take 10 mg by mouth Every Night. 3/9/20   Keena Urban MD   spironolactone (ALDACTONE) 25 MG tablet Take 1 tablet by mouth Daily. 11/20/17   Joshua Ernandez MD   vitamin C (ASCORBIC ACID) 500 MG tablet Take 500 mg by mouth Daily.    Provider, MD Jeffry     Allergies:  Adhesive tape, Lortab [hydrocodone-acetaminophen], Naproxen, Tekturna [aliskiren], and Penicillins    I have reviewed the patients medical history, surgical history and family history in the available medical record system.     Current Medications:     No current facility-administered medications for this encounter.        Objective     Physical Exam:     /54   Pulse 62   Temp 97.2 °F (36.2 °C)   Resp 16   Ht 162.6 cm (64\")   SpO2 100%   BMI 44.16 kg/m²     Physical Exam:  General Appearance:    Alert, cooperative, in no acute distress   Head:    Normocephalic, without obvious abnormality, atraumatic   Eyes:            Lids and lashes normal, conjunctivae and sclerae normal, no   icterus, no pallor, corneas clear, PERRLA   Ears:    Ears appear intact with no abnormalities noted   Throat:   No oral lesions, no thrush, oral mucosa moist   Neck:   No adenopathy, supple, trachea midline, no thyromegaly, no     carotid bruit, no JVD   Back:     No kyphosis present, no scoliosis present, no skin lesions,       erythema or scars, no tenderness to percussion or                   palpation,   range of motion normal   Lungs:     Clear to auscultation,respirations regular, even and                   unlabored    Heart:    Regular rhythm and normal rate, normal S1 and S2, no            murmur, no gallop, no rub, no click   Breast Exam:    Deferred   Abdomen:     Normal bowel sounds, no masses, no organomegaly, soft "        non-tender, non-distended, no guarding, no rebound                 tenderness   Genitalia:    Deferred   Extremities:   Moves all extremities well, no edema, no cyanosis, no              redness   Pulses:   Pulses palpable and equal bilaterally   Skin:   No bleeding, bruising or rash   Lymph nodes:   No palpable adenopathy   Neurologic:   Cranial nerves 2 - 12 grossly intact, sensation intact, DTR        present and equal bilaterally      Results Review:     Lab Results   Component Value Date    WBC 6.1 09/16/2019    WBC 5.8 04/20/2018    HGB 11.9 (L) 09/16/2019    HGB 12.2 (L) 04/20/2018    HCT 37.1 (L) 09/16/2019    HCT 37.7 (L) 04/20/2018     09/16/2019     04/20/2018             No results found for: LIPASE  No results found for: INR  No results found for: CULTURE    Radiology Review:  Imaging Results (Last 72 Hours)     ** No results found for the last 72 hours. **           I reviewed the patient's new clinical results.  I reviewed the patient's new imaging results and agree with the interpretation.     ASSESSMENT/PLAN:   ASSESSMENT:  1.  Screen for colon cancer  2.  Family history of colon cancer.  Father with colon cancer    PLAN:  1.  Colonoscopy    Risk and benefits associated with the procedure are reviewed with the patient.  The patient wished to proceed     Eren Byers DO  09/16/20  14:26 CDT

## 2020-09-16 NOTE — ANESTHESIA POSTPROCEDURE EVALUATION
Patient: Aleksandra Joseph    Procedure Summary     Date: 09/16/20 Room / Location: [facility] ENDOSCOPY 2 / [facility] ENDOSCOPY    Anesthesia Start: 1500 Anesthesia Stop: 1520    Procedure: COLONOSCOPY (N/A ) Diagnosis:       FH: GI cancer      (FH: GI cancer [Z80.0])    Surgeon: Eren Byers DO Provider: Elke Gresham CRNA    Anesthesia Type: MAC ASA Status: 3          Anesthesia Type: MAC    Vitals  No vitals data found for the desired time range.          Post Anesthesia Care and Evaluation    Patient location during evaluation: bedside  Patient participation: waiting for patient participation  Level of consciousness: sleepy but conscious  Pain score: 0  Pain management: adequate  Airway patency: patent  Anesthetic complications: No anesthetic complications  PONV Status: none  Cardiovascular status: acceptable  Respiratory status: acceptable  Hydration status: acceptable

## 2020-09-16 NOTE — ANESTHESIA PREPROCEDURE EVALUATION
Anesthesia Evaluation     Patient summary reviewed and Nursing notes reviewed                Airway   Mallampati: II  TM distance: >3 FB  Neck ROM: full  No difficulty expected  Dental - normal exam     Pulmonary - normal exam   (+) shortness of breath,   Cardiovascular - normal exam    PT is on anticoagulation therapy    (+) hypertension well controlled, CAD, cardiac stents more than 12 months ago hyperlipidemia,       Neuro/Psych  (+) numbness,     GI/Hepatic/Renal/Endo    (+) obesity, morbid obesity,      Musculoskeletal (-) negative ROS    Abdominal  - normal exam   Substance History - negative use     OB/GYN negative ob/gyn ROS         Other                        Anesthesia Plan    ASA 3     MAC     intravenous induction     Anesthetic plan, all risks, benefits, and alternatives have been provided, discussed and informed consent has been obtained with: patient.

## 2020-09-21 LAB
LAB AP CASE REPORT: NORMAL
PATH REPORT.FINAL DX SPEC: NORMAL

## 2020-11-16 DIAGNOSIS — I25.10 CORONARY ARTERY DISEASE INVOLVING NATIVE CORONARY ARTERY OF NATIVE HEART WITHOUT ANGINA PECTORIS: Primary | ICD-10-CM

## 2020-11-16 RX ORDER — ISOSORBIDE MONONITRATE 30 MG/1
30 TABLET, EXTENDED RELEASE ORAL DAILY
Qty: 90 TABLET | Refills: 3 | Status: SHIPPED | OUTPATIENT
Start: 2020-11-16 | End: 2021-11-22 | Stop reason: SDUPTHER

## 2020-12-07 ENCOUNTER — TELEPHONE (OUTPATIENT)
Dept: ADMINISTRATIVE | Facility: HOSPITAL | Age: 65
End: 2020-12-07

## 2020-12-08 RX ORDER — LABETALOL 200 MG/1
200 TABLET, FILM COATED ORAL EVERY 12 HOURS
Qty: 180 TABLET | Refills: 2 | Status: CANCELLED | OUTPATIENT
Start: 2020-12-08

## 2020-12-08 RX ORDER — FUROSEMIDE 20 MG/1
20 TABLET ORAL DAILY
Qty: 90 TABLET | Refills: 2 | Status: CANCELLED | OUTPATIENT
Start: 2020-12-08

## 2020-12-11 ENCOUNTER — OFFICE VISIT (OUTPATIENT)
Dept: CARDIOLOGY | Facility: CLINIC | Age: 65
End: 2020-12-11

## 2020-12-11 VITALS
OXYGEN SATURATION: 100 % | DIASTOLIC BLOOD PRESSURE: 78 MMHG | SYSTOLIC BLOOD PRESSURE: 158 MMHG | WEIGHT: 257.6 LBS | BODY MASS INDEX: 43.98 KG/M2 | HEART RATE: 62 BPM | HEIGHT: 64 IN

## 2020-12-11 DIAGNOSIS — R07.89 CHEST PAIN, ATYPICAL: ICD-10-CM

## 2020-12-11 DIAGNOSIS — I25.10 CORONARY ARTERY DISEASE INVOLVING NATIVE CORONARY ARTERY OF NATIVE HEART WITHOUT ANGINA PECTORIS: Primary | ICD-10-CM

## 2020-12-11 DIAGNOSIS — I10 ESSENTIAL HYPERTENSION: ICD-10-CM

## 2020-12-11 DIAGNOSIS — E78.2 MIXED HYPERLIPIDEMIA: ICD-10-CM

## 2020-12-11 PROCEDURE — 99214 OFFICE O/P EST MOD 30 MIN: CPT | Performed by: INTERNAL MEDICINE

## 2020-12-11 PROCEDURE — 93000 ELECTROCARDIOGRAM COMPLETE: CPT | Performed by: INTERNAL MEDICINE

## 2020-12-11 RX ORDER — FLUCONAZOLE 200 MG/1
TABLET ORAL
COMMUNITY
Start: 2020-11-09

## 2020-12-11 NOTE — PROGRESS NOTES
Aleksandra Joseph  65 y.o. female      1. Coronary artery disease involving native coronary artery of native heart without angina pectoris    2. Essential hypertension    3. Mixed hyperlipidemia    4. Chest pain, atypical        History of Present Illness:  Aleksandra Joseph is here for follow-up of her above-stated problems.  About 2 weeks prior the patient had brief episodes of burning chest discomfort that lasted just for a few seconds.  It is not related to exertion.  She was in a recliner when this happened.  She denied any associated shortness of breath, diaphoresis or palpitation.    The patient has been compliant with her medications and claims to be watching her diet.  She underwent PCI to right coronary artery in June 2011    Her lipid profiles were in the normal range when checked in June this year.    EKG showed sinus rhythm with heart rate of 57 bpm.  Baseline artifact.  Minimal nonspecific T wave changes.    Clinical exam was unremarkable.  There was no chest wall tenderness.  Blood pressure was mildly elevated.    SUBJECTIVE    Allergies   Allergen Reactions   • Adhesive Tape      bandaids   • Lortab [Hydrocodone-Acetaminophen] Unknown - Low Severity     unknown   • Naproxen      FACIAL SWELLING   • Tekturna [Aliskiren]      EXTREME EYE SWELLING   • Penicillins Rash         Past Medical History:   Diagnosis Date   • Allergic conjunctivitis    • Borderline glaucoma    • Carpal tunnel syndrome    • Disease of thyroid gland    • Diverticulitis of colon    • Drusen of optic disc    • Hemorrhoids    • Hyperlipidemia    • Hypertension    • Ingrowing nail    • Vitreous detachment of right eye          Past Surgical History:   Procedure Laterality Date   • ANKLE SURGERY Right 08/14/1971    ORIF of trimalleolar fracture, right ankle   • CARDIAC CATHETERIZATION  08/06/1991    chest pain etiology undetermined. Hypertension   • CARPAL TUNNEL RELEASE  02/16/2012    Endoscopic carpal tunnel release. left wrist   •  CATARACT EXTRACTION W/ INTRAOCULAR LENS IMPLANT Left 7/31/2020    Procedure: REMOVE CATARACT AND IMPLANT INTRAOCULAR LENS;  Surgeon: Timothy Portillo MD;  Location: [facility] OR;  Service: Ophthalmology;  Laterality: Left;   • CATARACT EXTRACTION W/ INTRAOCULAR LENS IMPLANT Right 8/7/2020    Procedure: REMOVE CATARACT AND IMPLANT INTRAOCULAR LENS;  Surgeon: Timothy Portillo MD;  Location: [facility] OR;  Service: Ophthalmology;  Laterality: Right;   • CHOLECYSTECTOMY  09/26/2001    biliary colic   • COLONOSCOPY  11/25/1997    Mild diverticulosis of sigmoid colo w/ no evidence of diverticulitis. Internal hemorrhoidsHematochezia suspected related to internal hemorrhoids   • COLONOSCOPY N/A 9/16/2020    Procedure: COLONOSCOPY;  Surgeon: Eren Byers DO;  Location: [facility] ENDOSCOPY;  Service: Gastroenterology;  Laterality: N/A;   • CORONARY STENT PLACEMENT     • DILATATION AND CURETTAGE  01/06/1984    dysfunctional uterine bleeding   • EXCISION LESION  07/18/2002    Shave excision, right superior earlobe. Intradermal nevus, symptomatic, right superior earlobe   • HAND SURGERY  12/18/1971    excision of ganglion cyst, right palm.   • HYSTERECTOMY  11/20/1992    laparoscopically assisted vaginal hysterectomy & RSO. Pelvic pain, dyspareunia, menorrhagia, dysmenorrhea. Same plus? endometriosis of right ovary   • KNEE ARTHROSCOPY  12/11/2012    Arthroscopy, medial & lateral meniscectomy, partial posteriorly w/ chondroplasty of the patella. Torn medial meniscus right knee   • NAIL BED REMOVAL/REVISION  01/18/1996    Negative for exercise induced ischemia at a level of 87% of the maximum predicted HR. Abnormal blood pressure response to exercise. Functional Class I   • PAP SMEAR  05/02/1995    NORMAL   • REPLACEMENT TOTAL KNEE Left          Family History   Problem Relation Age of Onset   • Breast cancer Other    • Colon cancer Other    • Diabetes Other    • Heart disease Other    • Hypertension Other    • Thyroid  disease Other    • Colon cancer Father    • Breast cancer Mother          Social History     Socioeconomic History   • Marital status:      Spouse name: Not on file   • Number of children: Not on file   • Years of education: Not on file   • Highest education level: Not on file   Tobacco Use   • Smoking status: Never Smoker   • Smokeless tobacco: Never Used   Substance and Sexual Activity   • Alcohol use: No   • Drug use: No   • Sexual activity: Not Currently     Birth control/protection: Abstinence, Post-menopausal         Current Outpatient Medications   Medication Sig Dispense Refill   • aspirin 81 MG EC tablet Take 81 mg by mouth Daily.     • Calcium Carbonate-Vitamin D (CALCIUM 500 + D PO) Take  by mouth.     • CloNIDine (CATAPRES) 0.1 MG tablet TAKE 1 TABLET BY MOUTH EVERY 12 HOURS (Patient taking differently: Take 0.1 mg by mouth 2 (Two) Times a Day.) 180 tablet 2   • clopidogrel (PLAVIX) 75 MG tablet TAKE 1 TABLET BY MOUTH EVERY DAY 90 tablet 3   • Coenzyme Q10 (CO Q-10) 400 MG capsule Take 100 mg by mouth Daily.     • dicyclomine (BENTYL) 10 MG capsule Take 10 mg by mouth As Needed.     • doxycycline (VIBRAMYCIN) 100 MG capsule Take 100 mg by mouth Daily.  2   • estradiol (ESTRACE) 1 MG tablet Take 1 tablet by mouth Daily. 90 tablet 1   • fluconazole (DIFLUCAN) 200 MG tablet TAKE 1 TABLET BY MOUTH DAILY AS NEEDED.     • furosemide (LASIX) 20 MG tablet TAKE 1 TABLET BY MOUTH EVERY DAY (Patient taking differently: Take 20 mg by mouth Daily.) 90 tablet 2   • Glucosamine-Chondroitin (OSTEO BI-FLEX REGULAR STRENGTH PO) Take  by mouth.     • isosorbide mononitrate (IMDUR) 30 MG 24 hr tablet Take 1 tablet by mouth Daily. 90 tablet 3   • labetalol (NORMODYNE) 200 MG tablet TAKE 1 TABLET BY MOUTH EVERY 12 HOURS (Patient taking differently: Take 200 mg by mouth 2 (Two) Times a Day.) 180 tablet 2   • lansoprazole (PREVACID) 30 MG capsule Take 30 mg by mouth Daily.     • latanoprost (XALATAN) 0.005 % ophthalmic  "solution Administer 1 drop to both eyes Every Night. 7.5 mL 3   • levothyroxine (SYNTHROID, LEVOTHROID) 100 MCG tablet Take 100 mcg by mouth Daily.     • Multiple Vitamins-Minerals (MULTIVITAMIN ADULT PO) Take  by mouth.     • nitroglycerin (NITROSTAT) 0.4 MG SL tablet 1 under the tongue as needed for angina, may repeat q5mins for up three doses 30 tablet 3   • olmesartan (BENICAR) 20 MG tablet Take 2 tablets by mouth Daily. (Patient taking differently: Take 40 mg by mouth Daily. Pt is take 1 tablet every morning.) 30 tablet 6   • Omega-3 Fatty Acids (FISH OIL) 1000 MG capsule capsule Take  by mouth Daily With Breakfast.     • rosuvastatin (CRESTOR) 10 MG tablet TAKE 1 TABLET BY MOUTH EVERY DAY (Patient taking differently: Take 10 mg by mouth Every Night.) 90 tablet 2   • spironolactone (ALDACTONE) 25 MG tablet Take 1 tablet by mouth Daily. 90 tablet 2   • vitamin C (ASCORBIC ACID) 500 MG tablet Take 500 mg by mouth Daily.       No current facility-administered medications for this visit.          OBJECTIVE    /78 (BP Location: Left arm, Patient Position: Sitting, Cuff Size: Adult)   Pulse 62   Ht 162.6 cm (64\")   Wt 117 kg (257 lb 9.6 oz)   SpO2 100%   BMI 44.22 kg/m²         Review of Systems     Constitutional:  Denies recent weight loss, weight gain, fever or chills, no change in exercise tolerance     HENT:  Denies any hearing loss, epistaxis, hoarseness, or difficulty speaking.     Eyes: Wears eyeglasses or contact lenses     Respiratory:  Denies dyspnea with exertion,no cough, wheezing, or hemoptysis.     Cardiovascular: See HPI    Gastrointestinal:  Denies change in bowel habits, dyspepsia, ulcer disease, hematochezia, or melena.     Endocrine: Negative for cold intolerance, heat intolerance, polydipsia, polyphagia and polyuria.  Hypothyroidism    Genitourinary: Negative.      Musculoskeletal: DJD    Skin:  Denies any change in hair or nails, rashes, or skin lesions.     Allergic/Immunologic: " Negative.  Negative for environmental allergies, food allergies and immunocompromised state.     Neurological:  Denies any history of recurrent headaches, strokes, TIA, or seizure disorder.     Hematological: Denies any food allergies, seasonal allergies, bleeding disorders, or lymphadenopathy.     Psychiatric/Behavioral: Denies any history of depression, substance abuse, or change in cognitive function.         Physical Exam     Constitutional: Cooperative, alert and oriented,  in no acute distress.     HENT:   Head: Normocephalic, normal hair patterns, no masses or tenderness.  Ears, Nose, and Throat: No gross abnormalities. No pallor or cyanosis.  Eyes: EOMS intact, PERRL, conjunctivae and lids unremarkable. Fundoscopic exam and visual fields not performed.   Neck: No palpable masses or adenopathy, no thyromegaly, no JVD, carotid pulses are full and equal bilaterally and without  Bruits.     Cardiovascular: Regular rhythm, S1 and S2 normal, no S3 or S4.  No murmurs, gallops, or rubs detected.     Pulmonary/Chest: Chest: normal symmetry, no tenderness to palpation, normal respiratory excursion, no intercostal retraction, no use of accessory muscles.            Pulmonary: Normal breath sounds. No rales or ronchi.    Abdominal: Abdomen soft, bowel sounds normoactive, no masses, no hepatosplenomegaly, non-tender, no bruits.     Musculoskeletal: No deformities, clubbing, cyanosis, erythema, or edema observed.     Neurological: No gross motor or sensory deficits noted, affect appropriate, oriented to time, person, place.     Skin: Warm and dry to the touch, no apparent skin lesions or masses noted.     Psychiatric: She has a normal mood and affect. Her behavior is normal. Judgment and thought content normal.         Procedures      Lab Results   Component Value Date    WBC 6.1 09/16/2019    HGB 11.9 (L) 09/16/2019    HCT 37.1 (L) 09/16/2019    MCV 90 09/16/2019     09/16/2019     Lab Results   Component Value  Date    BUN 28 (H) 04/20/2018    CREATININE 1 04/20/2018    CALCIUM 9.3 04/20/2018    ALBUMIN 3.9 04/20/2018    AST 12 (L) 04/20/2018    ALT 31 04/20/2018     Lab Results   Component Value Date    CHOL 145 06/29/2020    CHOL 154 09/16/2019    CHOL 160 04/20/2018     Lab Results   Component Value Date    TRIG 98 06/29/2020    TRIG 159 (H) 09/16/2019    TRIG 113 04/20/2018     Lab Results   Component Value Date    HDL 40 06/29/2020    HDL 35 (L) 09/16/2019    HDL 43 04/20/2018     No components found for: LDLCALC  Lab Results   Component Value Date    LDL 82 06/29/2020    LDL 84 09/16/2019    LDL 96 04/20/2018     No results found for: HDLLDLRATIO  No components found for: CHOLHDL  Lab Results   Component Value Date    HGBA1C 5.7 12/02/2020     Lab Results   Component Value Date    TSH 1.12 04/20/2018           ASSESSMENT AND PLAN  Mr. Joseph has multiple medical issues including hypertension, obesity, hyperlipidemia, previous coronary artery disease with PCI to right coronary artery in 2011 at [place].  Chest pain does have some atypical features but given her history I believe that will be prudent to proceed with noninvasive testing to make sure that there is no progression of coronary artery disease.    An echocardiogram to assess left ventricular and valvular function and a Lexiscan Cardiolite stress test using a 2-day protocol has been arranged.  All risks and benefits have been explained to her.  Further recommendations will follow.    I have continued antiplatelet therapy with aspirin and Plavix, antihypertensive therapy with clonidine, labetalol, Benicar, Aldactone and lipid-lowering therapy with Crestor and antianginal therapy with isosorbide mononitrate has been continued.    Diagnoses and all orders for this visit:    1. Coronary artery disease involving native coronary artery of native heart without angina pectoris (Primary)  -     ECG 12 Lead  -     Adult Transthoracic Echo Complete W/ Cont if  Necessary Per Protocol; Future  -     Stress Test With Myocardial Perfusion Two Day; Future    2. Essential hypertension  -     Adult Transthoracic Echo Complete W/ Cont if Necessary Per Protocol; Future  -     Stress Test With Myocardial Perfusion Two Day; Future    3. Mixed hyperlipidemia  -     Adult Transthoracic Echo Complete W/ Cont if Necessary Per Protocol; Future  -     Stress Test With Myocardial Perfusion Two Day; Future    4. Chest pain, atypical  -     Adult Transthoracic Echo Complete W/ Cont if Necessary Per Protocol; Future  -     Stress Test With Myocardial Perfusion Two Day; Future        Patient's Body mass index is 44.22 kg/m². BMI is above normal parameters. Recommendations include: exercise counseling and nutrition counseling.  Patient is a non-smoker.    Joshua Ernandez MD  12/11/2020  08:54 CST

## 2020-12-14 RX ORDER — LABETALOL 200 MG/1
200 TABLET, FILM COATED ORAL EVERY 12 HOURS
Qty: 180 TABLET | Refills: 2 | Status: SHIPPED | OUTPATIENT
Start: 2020-12-14 | End: 2021-09-16 | Stop reason: SDUPTHER

## 2020-12-14 RX ORDER — FUROSEMIDE 20 MG/1
20 TABLET ORAL DAILY
Qty: 90 TABLET | Refills: 2 | Status: SHIPPED | OUTPATIENT
Start: 2020-12-14 | End: 2021-09-16 | Stop reason: SDUPTHER

## 2020-12-22 LAB
QT INTERVAL: 454 MS
QTC INTERVAL: 441 MS

## 2020-12-30 ENCOUNTER — HOSPITAL ENCOUNTER (OUTPATIENT)
Dept: NUCLEAR MEDICINE | Facility: HOSPITAL | Age: 65
Discharge: HOME | End: 2020-12-30

## 2020-12-30 DIAGNOSIS — I10 ESSENTIAL HYPERTENSION: ICD-10-CM

## 2020-12-30 DIAGNOSIS — E78.2 MIXED HYPERLIPIDEMIA: ICD-10-CM

## 2020-12-30 DIAGNOSIS — R07.89 CHEST PAIN, ATYPICAL: ICD-10-CM

## 2020-12-30 DIAGNOSIS — I25.10 CORONARY ARTERY DISEASE INVOLVING NATIVE CORONARY ARTERY OF NATIVE HEART WITHOUT ANGINA PECTORIS: ICD-10-CM

## 2020-12-30 PROCEDURE — 78452 HT MUSCLE IMAGE SPECT MULT: CPT | Performed by: INTERNAL MEDICINE

## 2020-12-30 PROCEDURE — 93018 CV STRESS TEST I&R ONLY: CPT | Performed by: INTERNAL MEDICINE

## 2020-12-30 PROCEDURE — 93017 CV STRESS TEST TRACING ONLY: CPT

## 2020-12-30 PROCEDURE — 78452 HT MUSCLE IMAGE SPECT MULT: CPT

## 2020-12-30 PROCEDURE — A9500 TC99M SESTAMIBI: HCPCS | Performed by: INTERNAL MEDICINE

## 2020-12-30 PROCEDURE — 0 TECHNETIUM SESTAMIBI: Performed by: INTERNAL MEDICINE

## 2020-12-30 PROCEDURE — 93016 CV STRESS TEST SUPVJ ONLY: CPT | Performed by: INTERNAL MEDICINE

## 2020-12-30 RX ADMIN — TECHNETIUM TC 99M SESTAMIBI 1 DOSE: 1 INJECTION INTRAVENOUS at 08:29

## 2020-12-31 ENCOUNTER — HOSPITAL ENCOUNTER (OUTPATIENT)
Dept: CARDIOLOGY | Facility: HOSPITAL | Age: 65
Discharge: HOME | End: 2020-12-31

## 2020-12-31 ENCOUNTER — HOSPITAL ENCOUNTER (OUTPATIENT)
Dept: NUCLEAR MEDICINE | Facility: HOSPITAL | Age: 65
Discharge: HOME | End: 2020-12-31

## 2020-12-31 PROCEDURE — 0 TECHNETIUM SESTAMIBI: Performed by: INTERNAL MEDICINE

## 2020-12-31 PROCEDURE — A9500 TC99M SESTAMIBI: HCPCS | Performed by: INTERNAL MEDICINE

## 2020-12-31 PROCEDURE — 25010000002 REGADENOSON 0.4 MG/5ML SOLUTION: Performed by: INTERNAL MEDICINE

## 2020-12-31 RX ORDER — SODIUM CHLORIDE 0.9 % (FLUSH) 0.9 %
10 SYRINGE (ML) INJECTION ONCE
Status: COMPLETED | OUTPATIENT
Start: 2020-12-31 | End: 2020-12-31

## 2020-12-31 RX ADMIN — REGADENOSON 0.4 MG: 0.08 INJECTION, SOLUTION INTRAVENOUS at 08:12

## 2020-12-31 RX ADMIN — SODIUM CHLORIDE, PRESERVATIVE FREE 10 ML: 5 INJECTION INTRAVENOUS at 08:12

## 2020-12-31 RX ADMIN — TECHNETIUM TC 99M SESTAMIBI 1 DOSE: 1 INJECTION INTRAVENOUS at 08:12

## 2021-01-02 LAB
BH CV STRESS BP STAGE 1: NORMAL
BH CV STRESS COMMENTS STAGE 1: NORMAL
BH CV STRESS DOSE REGADENOSON STAGE 1: 0.4
BH CV STRESS DURATION MIN STAGE 1: 0
BH CV STRESS DURATION SEC STAGE 1: 10
BH CV STRESS HR STAGE 1: 65
BH CV STRESS PROTOCOL 1: NORMAL
BH CV STRESS RECOVERY BP: NORMAL MMHG
BH CV STRESS RECOVERY HR: 64 BPM
BH CV STRESS STAGE 1: 1
LV EF NUC BP: 75 %
MAXIMAL PREDICTED HEART RATE: 157 BPM
PERCENT MAX PREDICTED HR: 44.59 %
STRESS BASELINE BP: NORMAL MMHG
STRESS BASELINE HR: 52 BPM
STRESS PERCENT HR: 52 %
STRESS POST ESTIMATED WORKLOAD: 1 METS
STRESS POST PEAK BP: NORMAL MMHG
STRESS POST PEAK HR: 70 BPM
STRESS TARGET HR: 133 BPM

## 2021-01-04 ENCOUNTER — DOCUMENTATION (OUTPATIENT)
Dept: CARDIOLOGY | Facility: CLINIC | Age: 66
End: 2021-01-04

## 2021-01-04 NOTE — PROGRESS NOTES
Echo and stress test results per Dr Garrison    Echo   heart muscle thick but EF good  Stress test  Some Artifact present but low risk    Recommended  Optimal Blood pressure control    Discussed  Results and blood pressure monitoring and reporting

## 2021-01-13 ENCOUNTER — DOCUMENTATION (OUTPATIENT)
Dept: CARDIOLOGY | Facility: CLINIC | Age: 66
End: 2021-01-13

## 2021-01-13 NOTE — PROGRESS NOTES
I spoke with patient as she had some questions about her echo results. I let her know she is not in heart failure. Her muscle is thick but at this time her EF is normal. I let her know that healthy life habits such as diet and exercise, and controlling high blood pressure could help the progression of thickness.    She verbalized understanding

## 2021-03-23 RX ORDER — ROSUVASTATIN CALCIUM 10 MG/1
10 TABLET, COATED ORAL DAILY
Qty: 90 TABLET | Refills: 2 | Status: SHIPPED | OUTPATIENT
Start: 2021-03-23 | End: 2022-04-01 | Stop reason: SDUPTHER

## 2021-06-29 ENCOUNTER — OFFICE VISIT (OUTPATIENT)
Dept: CARDIOLOGY | Facility: CLINIC | Age: 66
End: 2021-06-29

## 2021-06-29 VITALS
SYSTOLIC BLOOD PRESSURE: 132 MMHG | TEMPERATURE: 96.4 F | OXYGEN SATURATION: 98 % | BODY MASS INDEX: 40.97 KG/M2 | HEIGHT: 64 IN | DIASTOLIC BLOOD PRESSURE: 72 MMHG | WEIGHT: 240 LBS | HEART RATE: 62 BPM

## 2021-06-29 DIAGNOSIS — I25.10 CORONARY ARTERY DISEASE INVOLVING NATIVE CORONARY ARTERY OF NATIVE HEART WITHOUT ANGINA PECTORIS: Primary | ICD-10-CM

## 2021-06-29 DIAGNOSIS — E78.2 MIXED HYPERLIPIDEMIA: ICD-10-CM

## 2021-06-29 DIAGNOSIS — I10 ESSENTIAL HYPERTENSION: ICD-10-CM

## 2021-06-29 PROCEDURE — 99213 OFFICE O/P EST LOW 20 MIN: CPT | Performed by: INTERNAL MEDICINE

## 2021-06-29 NOTE — PROGRESS NOTES
Aleksandra Joseph  65 y.o. female      1. Coronary artery disease involving native coronary artery of native heart without angina pectoris    2. Essential hypertension    3. Mixed hyperlipidemia        History of Present Illness:  Aleksandra Joseph is here for follow-up of her above-stated problems. She underwent PCI to right coronary artery in June 2011    She was evaluated for brief episodes of burning/sharp chest discomfort but 6 months prior and the testing included the following:  Echocardiogram in December 2020 showed:  · Definity used to optimize the study  · Left ventricular wall thickness is consistent with moderate concentric hypertrophy.  · Estimated left ventricular EF = 65% Left ventricular ejection fraction appears to be 61 - 65%. Left ventricular systolic function is normal.  · The left atrial cavity is mildly dilated.  · Estimated right ventricular systolic pressure from tricuspid regurgitation is normal (<35 mmHg).    Lexiscan Cardiolite stress test in January 2021 showed:  · Lexiscan infusion showed low voltage complexes. Baseline sinus bradycardia. EKG findings equivocal  · There was breast attenuation artifact noted.  · Attenuation corrected images showed no fixed or reversible defects. Ejection fraction 75%. No wall motion abnormalities.  · Low risk study.     She has not had any recurrence of symptoms and has been able to perform her activities of daily living without any restrictions.  She has been compliant with her medications.  She had lab work done by Dr. Rueda in May 2021 and the results were reviewed.  LDL was 91 and HDL 48 in May 2021.      SUBJECTIVE    Allergies   Allergen Reactions   • Adhesive Tape      bandaids   • Lortab [Hydrocodone-Acetaminophen] Unknown - Low Severity     unknown   • Naproxen      FACIAL SWELLING   • Tekturna [Aliskiren]      EXTREME EYE SWELLING   • Penicillins Rash         Past Medical History:   Diagnosis Date   • Allergic conjunctivitis    • Borderline  glaucoma    • Carpal tunnel syndrome    • Disease of thyroid gland    • Diverticulitis of colon    • Drusen of optic disc    • Hemorrhoids    • Hyperlipidemia    • Hypertension    • Ingrowing nail    • Vitreous detachment of right eye          Past Surgical History:   Procedure Laterality Date   • ANKLE SURGERY Right 08/14/1971    ORIF of trimalleolar fracture, right ankle   • CARDIAC CATHETERIZATION  08/06/1991    chest pain etiology undetermined. Hypertension   • CARPAL TUNNEL RELEASE  02/16/2012    Endoscopic carpal tunnel release. left wrist   • CATARACT EXTRACTION W/ INTRAOCULAR LENS IMPLANT Left 7/31/2020    Procedure: REMOVE CATARACT AND IMPLANT INTRAOCULAR LENS;  Surgeon: Timothy Portillo MD;  Location: [facility] OR;  Service: Ophthalmology;  Laterality: Left;   • CATARACT EXTRACTION W/ INTRAOCULAR LENS IMPLANT Right 8/7/2020    Procedure: REMOVE CATARACT AND IMPLANT INTRAOCULAR LENS;  Surgeon: Timothy Portillo MD;  Location: [facility] OR;  Service: Ophthalmology;  Laterality: Right;   • CHOLECYSTECTOMY  09/26/2001    biliary colic   • COLONOSCOPY  11/25/1997    Mild diverticulosis of sigmoid colo w/ no evidence of diverticulitis. Internal hemorrhoidsHematochezia suspected related to internal hemorrhoids   • COLONOSCOPY N/A 9/16/2020    Procedure: COLONOSCOPY;  Surgeon: Eren Byers DO;  Location: [facility] ENDOSCOPY;  Service: Gastroenterology;  Laterality: N/A;   • CORONARY STENT PLACEMENT     • DILATATION AND CURETTAGE  01/06/1984    dysfunctional uterine bleeding   • EXCISION LESION  07/18/2002    Shave excision, right superior earlobe. Intradermal nevus, symptomatic, right superior earlobe   • HAND SURGERY  12/18/1971    excision of ganglion cyst, right palm.   • HYSTERECTOMY  11/20/1992    laparoscopically assisted vaginal hysterectomy & RSO. Pelvic pain, dyspareunia, menorrhagia, dysmenorrhea. Same plus? endometriosis of right ovary   • KNEE ARTHROSCOPY  12/11/2012    Arthroscopy, medial &  lateral meniscectomy, partial posteriorly w/ chondroplasty of the patella. Torn medial meniscus right knee   • NAIL BED REMOVAL/REVISION  01/18/1996    Negative for exercise induced ischemia at a level of 87% of the maximum predicted HR. Abnormal blood pressure response to exercise. Functional Class I   • PAP SMEAR  05/02/1995    NORMAL   • REPLACEMENT TOTAL KNEE Left          Family History   Problem Relation Age of Onset   • Breast cancer Other    • Colon cancer Other    • Diabetes Other    • Heart disease Other    • Hypertension Other    • Thyroid disease Other    • Colon cancer Father    • Breast cancer Mother          Social History     Socioeconomic History   • Marital status:      Spouse name: Not on file   • Number of children: Not on file   • Years of education: Not on file   • Highest education level: Not on file   Tobacco Use   • Smoking status: Never Smoker   • Smokeless tobacco: Never Used   Substance and Sexual Activity   • Alcohol use: No   • Drug use: No   • Sexual activity: Not Currently     Birth control/protection: Abstinence, Post-menopausal         Current Outpatient Medications   Medication Sig Dispense Refill   • aspirin 81 MG EC tablet Take 81 mg by mouth Daily.     • Calcium Carbonate-Vitamin D (CALCIUM 500 + D PO) Take  by mouth.     • CloNIDine (CATAPRES) 0.1 MG tablet TAKE 1 TABLET BY MOUTH EVERY 12 HOURS (Patient taking differently: Take 0.1 mg by mouth 2 (Two) Times a Day.) 180 tablet 2   • clopidogrel (PLAVIX) 75 MG tablet TAKE 1 TABLET BY MOUTH EVERY DAY 90 tablet 3   • Coenzyme Q10 (CO Q-10) 400 MG capsule Take 100 mg by mouth Daily.     • dicyclomine (BENTYL) 10 MG capsule Take 10 mg by mouth As Needed.     • doxycycline (VIBRAMYCIN) 100 MG capsule Take 100 mg by mouth Daily.  2   • estradiol (ESTRACE) 1 MG tablet Take 1 tablet by mouth Daily. 90 tablet 1   • fluconazole (DIFLUCAN) 200 MG tablet TAKE 1 TABLET BY MOUTH DAILY AS NEEDED.     • furosemide (LASIX) 20 MG tablet  "Take 1 tablet by mouth Daily. 90 tablet 2   • Glucosamine-Chondroitin (OSTEO BI-FLEX REGULAR STRENGTH PO) Take  by mouth.     • isosorbide mononitrate (IMDUR) 30 MG 24 hr tablet Take 1 tablet by mouth Daily. 90 tablet 3   • labetalol (NORMODYNE) 200 MG tablet Take 1 tablet by mouth Every 12 (Twelve) Hours. 180 tablet 2   • lansoprazole (PREVACID) 30 MG capsule Take 30 mg by mouth Daily.     • latanoprost (XALATAN) 0.005 % ophthalmic solution Administer 1 drop to both eyes Every Night. 7.5 mL 3   • levothyroxine (SYNTHROID, LEVOTHROID) 100 MCG tablet Take 100 mcg by mouth Daily.     • Multiple Vitamins-Minerals (MULTIVITAMIN ADULT PO) Take  by mouth.     • nitroglycerin (NITROSTAT) 0.4 MG SL tablet 1 under the tongue as needed for angina, may repeat q5mins for up three doses 30 tablet 3   • olmesartan (BENICAR) 20 MG tablet Take 2 tablets by mouth Daily. (Patient taking differently: Take 40 mg by mouth Daily. Pt is take 1 tablet every morning.) 30 tablet 6   • Omega-3 Fatty Acids (FISH OIL) 1000 MG capsule capsule Take  by mouth Daily With Breakfast.     • rosuvastatin (CRESTOR) 10 MG tablet Take 1 tablet by mouth Daily. 90 tablet 2   • spironolactone (ALDACTONE) 25 MG tablet Take 1 tablet by mouth Daily. 90 tablet 2   • vitamin C (ASCORBIC ACID) 500 MG tablet Take 500 mg by mouth Daily.       No current facility-administered medications for this visit.         OBJECTIVE    /72 (BP Location: Left arm, Patient Position: Sitting, Cuff Size: Adult)   Pulse 62   Temp 96.4 °F (35.8 °C)   Ht 162.6 cm (64.02\")   Wt 109 kg (240 lb)   SpO2 98%   BMI 41.17 kg/m²         Review of Systems : The following systems are reviewed and no changes noted.    Constitutional:  Denies recent weight loss, weight gain, fever or chills, no change in exercise tolerance     HENT:  Denies any hearing loss, epistaxis, hoarseness, or difficulty speaking.     Eyes: Wears eyeglasses or contact lenses     Respiratory:  Denies dyspnea with " exertion,no cough, wheezing, or hemoptysis.     Cardiovascular: No chest pain, shortness of breath or palpitation.    Gastrointestinal:  Denies change in bowel habits, dyspepsia, ulcer disease, hematochezia, or melena.     Endocrine: Negative for cold intolerance, heat intolerance, polydipsia, polyphagia and polyuria.  Hypothyroidism    Genitourinary: Negative.      Musculoskeletal: DJD    Skin:  Denies any change in hair or nails, rashes, or skin lesions.     Allergic/Immunologic: Negative.  Negative for environmental allergies, food allergies and immunocompromised state.     Neurological:  Denies any history of recurrent headaches, strokes, TIA, or seizure disorder.     Hematological: Denies any food allergies, seasonal allergies, bleeding disorders, or lymphadenopathy.     Psychiatric/Behavioral: Denies any history of depression, substance abuse, or change in cognitive function.         Physical Exam : The following systems reviewed and no changes noted    Constitutional: Cooperative, alert and oriented,  in no acute distress.     HENT:   Head: Normocephalic, normal hair patterns, no masses or tenderness.  Ears, Nose, and Throat: No gross abnormalities. No pallor or cyanosis.  Eyes: EOMS intact, PERRL, conjunctivae and lids unremarkable. Fundoscopic exam and visual fields not performed.   Neck: No palpable masses or adenopathy, no thyromegaly, no JVD, carotid pulses are full and equal bilaterally and without  Bruits.     Cardiovascular: Regular rhythm, S1 and S2 normal, no S3 or S4.  No murmurs, gallops, or rubs detected.     Pulmonary/Chest: Chest: normal symmetry, no tenderness to palpation, normal respiratory excursion, no intercostal retraction, no use of accessory muscles.            Pulmonary: Normal breath sounds. No rales or ronchi.    Abdominal: Abdomen soft, bowel sounds normoactive, no masses, no hepatosplenomegaly, non-tender, no bruits.     Musculoskeletal: No deformities, clubbing, cyanosis,  erythema, or edema observed.     Neurological: No gross motor or sensory deficits noted, affect appropriate, oriented to time, person, place.     Skin: Warm and dry to the touch, no apparent skin lesions or masses noted.     Psychiatric: She has a normal mood and affect. Her behavior is normal. Judgment and thought content normal.         Procedures      Lab Results   Component Value Date    WBC 6.1 09/16/2019    HGB 11.9 (L) 09/16/2019    HCT 37.1 (L) 09/16/2019    MCV 90 09/16/2019     09/16/2019     Lab Results   Component Value Date    BUN 28 (H) 04/20/2018    CREATININE 1 04/20/2018    CALCIUM 9.3 04/20/2018    ALBUMIN 3.9 04/20/2018    AST 12 (L) 04/20/2018    ALT 31 04/20/2018     Lab Results   Component Value Date    CHOL 162 05/28/2021    CHOL 145 06/29/2020    CHOL 154 09/16/2019     Lab Results   Component Value Date    TRIG 65 05/28/2021    TRIG 98 06/29/2020    TRIG 159 (H) 09/16/2019     Lab Results   Component Value Date    HDL 48 05/28/2021    HDL 40 06/29/2020    HDL 35 (L) 09/16/2019     No components found for: LDLCALC  Lab Results   Component Value Date    LDL 91 05/28/2021    LDL 82 06/29/2020    LDL 84 09/16/2019     No results found for: HDLLDLRATIO  No components found for: CHOLHDL  Lab Results   Component Value Date    HGBA1C 5.3 05/28/2021     Lab Results   Component Value Date    TSH 1.12 04/20/2018           ASSESSMENT AND PLAN  Mr. Joseph has multiple medical issues including hypertension, obesity, hyperlipidemia, previous coronary artery disease with PCI to right coronary artery in 2011 at [place].  She is progressing well with no clinical evidence of progression of coronary artery disease.    I have continued antiplatelet therapy with aspirin and Plavix, antihypertensive therapy with clonidine, labetalol, Benicar, Aldactone and lipid-lowering therapy with Crestor and antianginal therapy with isosorbide mononitrate.    Diagnoses and all orders for this visit:    1. Coronary  artery disease involving native coronary artery of native heart without angina pectoris (Primary)    2. Essential hypertension    3. Mixed hyperlipidemia        Patient's Body mass index is 41.17 kg/m². BMI is above normal parameters. Recommendations include: exercise counseling and nutrition counseling.  Patient is a non-smoker.    Joshua Ernandez MD  6/29/2021  11:56 CDT

## 2021-09-16 RX ORDER — FUROSEMIDE 20 MG/1
20 TABLET ORAL DAILY
Qty: 90 TABLET | Refills: 3 | Status: SHIPPED | OUTPATIENT
Start: 2021-09-16 | End: 2022-03-18

## 2021-09-16 RX ORDER — LABETALOL 200 MG/1
200 TABLET, FILM COATED ORAL EVERY 12 HOURS
Qty: 180 TABLET | Refills: 3 | Status: SHIPPED | OUTPATIENT
Start: 2021-09-16 | End: 2022-01-05

## 2021-11-22 DIAGNOSIS — I25.10 CORONARY ARTERY DISEASE INVOLVING NATIVE CORONARY ARTERY OF NATIVE HEART WITHOUT ANGINA PECTORIS: ICD-10-CM

## 2021-11-22 RX ORDER — ISOSORBIDE MONONITRATE 30 MG/1
30 TABLET, EXTENDED RELEASE ORAL DAILY
Qty: 90 TABLET | Refills: 3 | Status: SHIPPED | OUTPATIENT
Start: 2021-11-22 | End: 2022-11-02 | Stop reason: SDUPTHER

## 2021-11-22 RX ORDER — CLOPIDOGREL BISULFATE 75 MG/1
75 TABLET ORAL DAILY
Qty: 90 TABLET | Refills: 3 | Status: SHIPPED | OUTPATIENT
Start: 2021-11-22 | End: 2023-01-13 | Stop reason: SDUPTHER

## 2022-01-05 ENCOUNTER — OFFICE VISIT (OUTPATIENT)
Dept: CARDIOLOGY | Facility: CLINIC | Age: 67
End: 2022-01-05

## 2022-01-05 VITALS
SYSTOLIC BLOOD PRESSURE: 140 MMHG | BODY MASS INDEX: 40.46 KG/M2 | TEMPERATURE: 96.8 F | OXYGEN SATURATION: 99 % | WEIGHT: 237 LBS | DIASTOLIC BLOOD PRESSURE: 72 MMHG | HEIGHT: 64 IN | HEART RATE: 52 BPM

## 2022-01-05 DIAGNOSIS — I10 ESSENTIAL HYPERTENSION: ICD-10-CM

## 2022-01-05 DIAGNOSIS — E78.2 MIXED HYPERLIPIDEMIA: ICD-10-CM

## 2022-01-05 DIAGNOSIS — E66.01 CLASS 3 SEVERE OBESITY DUE TO EXCESS CALORIES WITH BODY MASS INDEX (BMI) OF 40.0 TO 44.9 IN ADULT, UNSPECIFIED WHETHER SERIOUS COMORBIDITY PRESENT: ICD-10-CM

## 2022-01-05 DIAGNOSIS — I25.10 CORONARY ARTERY DISEASE INVOLVING NATIVE CORONARY ARTERY OF NATIVE HEART WITHOUT ANGINA PECTORIS: Primary | ICD-10-CM

## 2022-01-05 PROCEDURE — 93000 ELECTROCARDIOGRAM COMPLETE: CPT | Performed by: INTERNAL MEDICINE

## 2022-01-05 PROCEDURE — 99214 OFFICE O/P EST MOD 30 MIN: CPT | Performed by: INTERNAL MEDICINE

## 2022-01-05 RX ORDER — FLUTICASONE PROPIONATE 50 MCG
SPRAY, SUSPENSION (ML) NASAL
COMMUNITY
Start: 2021-12-28

## 2022-01-05 RX ORDER — VITAMIN E 268 MG
CAPSULE ORAL
COMMUNITY

## 2022-01-05 RX ORDER — OLMESARTAN MEDOXOMIL 40 MG/1
TABLET ORAL
COMMUNITY
Start: 2021-12-07 | End: 2022-01-05 | Stop reason: SDUPTHER

## 2022-01-05 RX ORDER — LABETALOL 200 MG/1
100 TABLET, FILM COATED ORAL EVERY 12 HOURS
Qty: 180 TABLET | Refills: 3 | Status: SHIPPED
Start: 2022-01-05 | End: 2022-05-17

## 2022-01-05 RX ORDER — CETIRIZINE HYDROCHLORIDE 10 MG/1
TABLET ORAL
COMMUNITY
Start: 2021-12-28 | End: 2022-05-17 | Stop reason: SDUPTHER

## 2022-01-05 NOTE — PROGRESS NOTES
Aleksandra Joseph  66 y.o. female      1. Coronary artery disease involving native coronary artery of native heart without angina pectoris    2. Essential hypertension    3. Mixed hyperlipidemia    4. Class 3 severe obesity due to excess calories with body mass index (BMI) of 40.0 to 44.9 in adult, unspecified whether serious comorbidity present (HCC)        History of Present Illness:  Aleksandra Joseph is here for follow-up of her above-stated problems. She underwent PCI to right coronary artery in June 2011.    She denied any chest pain or shortness of breath but does have occasional dizziness especially when she quickly stands up.  No syncope is reported.  Her heart rate was noted to be 52 bpm today.  Blood pressure was 140/72 mmHg.  She has been compliant with her medicines.  She is on multiple antihypertensive agents.    Echocardiogram in December 2020 showed:  · Definity used to optimize the study  · Left ventricular wall thickness is consistent with moderate concentric hypertrophy.  · Estimated left ventricular EF = 65% Left ventricular ejection fraction appears to be 61 - 65%. Left ventricular systolic function is normal.  · The left atrial cavity is mildly dilated.  · Estimated right ventricular systolic pressure from tricuspid regurgitation is normal (<35 mmHg).    Lexiscan Cardiolite stress test in January 2021 showed:  · Lexiscan infusion showed low voltage complexes. Baseline sinus bradycardia. EKG findings equivocal  · There was breast attenuation artifact noted.  · Attenuation corrected images showed no fixed or reversible defects. Ejection fraction 75%. No wall motion abnormalities.  · Low risk study.     EKG performed today showed sinus rhythm with heart rate of 52 bpm.  Low voltage complexes.  QTc interval 435 ms.    SUBJECTIVE    Allergies   Allergen Reactions   • Adhesive Tape      bandaids   • Lortab [Hydrocodone-Acetaminophen] Unknown - Low Severity     unknown   • Naproxen      FACIAL SWELLING    • Tekturna [Aliskiren]      EXTREME EYE SWELLING   • Penicillins Rash         Past Medical History:   Diagnosis Date   • Allergic conjunctivitis    • Borderline glaucoma    • Carpal tunnel syndrome    • Disease of thyroid gland    • Diverticulitis of colon    • Drusen of optic disc    • Hemorrhoids    • Hyperlipidemia    • Hypertension    • Ingrowing nail    • Vitreous detachment of right eye          Past Surgical History:   Procedure Laterality Date   • ANKLE SURGERY Right 08/14/1971    ORIF of trimalleolar fracture, right ankle   • CARDIAC CATHETERIZATION  08/06/1991    chest pain etiology undetermined. Hypertension   • CARPAL TUNNEL RELEASE  02/16/2012    Endoscopic carpal tunnel release. left wrist   • CATARACT EXTRACTION W/ INTRAOCULAR LENS IMPLANT Left 7/31/2020    Procedure: REMOVE CATARACT AND IMPLANT INTRAOCULAR LENS;  Surgeon: Timothy Portillo MD;  Location: [facility] OR;  Service: Ophthalmology;  Laterality: Left;   • CATARACT EXTRACTION W/ INTRAOCULAR LENS IMPLANT Right 8/7/2020    Procedure: REMOVE CATARACT AND IMPLANT INTRAOCULAR LENS;  Surgeon: Timothy Portillo MD;  Location: [facility] OR;  Service: Ophthalmology;  Laterality: Right;   • CHOLECYSTECTOMY  09/26/2001    biliary colic   • COLONOSCOPY  11/25/1997    Mild diverticulosis of sigmoid colo w/ no evidence of diverticulitis. Internal hemorrhoidsHematochezia suspected related to internal hemorrhoids   • COLONOSCOPY N/A 9/16/2020    Procedure: COLONOSCOPY;  Surgeon: Eren Byers DO;  Location: [facility] ENDOSCOPY;  Service: Gastroenterology;  Laterality: N/A;   • CORONARY STENT PLACEMENT     • DILATATION AND CURETTAGE  01/06/1984    dysfunctional uterine bleeding   • EXCISION LESION  07/18/2002    Shave excision, right superior earlobe. Intradermal nevus, symptomatic, right superior earlobe   • HAND SURGERY  12/18/1971    excision of ganglion cyst, right palm.   • HYSTERECTOMY  11/20/1992    laparoscopically assisted vaginal  hysterectomy & RSO. Pelvic pain, dyspareunia, menorrhagia, dysmenorrhea. Same plus? endometriosis of right ovary   • KNEE ARTHROSCOPY  12/11/2012    Arthroscopy, medial & lateral meniscectomy, partial posteriorly w/ chondroplasty of the patella. Torn medial meniscus right knee   • NAIL BED REMOVAL/REVISION  01/18/1996    Negative for exercise induced ischemia at a level of 87% of the maximum predicted HR. Abnormal blood pressure response to exercise. Functional Class I   • PAP SMEAR  05/02/1995    NORMAL   • REPLACEMENT TOTAL KNEE Left          Family History   Problem Relation Age of Onset   • Breast cancer Other    • Colon cancer Other    • Diabetes Other    • Heart disease Other    • Hypertension Other    • Thyroid disease Other    • Colon cancer Father    • Breast cancer Mother          Social History     Socioeconomic History   • Marital status:    Tobacco Use   • Smoking status: Never Smoker   • Smokeless tobacco: Never Used   Substance and Sexual Activity   • Alcohol use: No   • Drug use: No   • Sexual activity: Not Currently     Birth control/protection: Abstinence, Post-menopausal         Current Outpatient Medications   Medication Sig Dispense Refill   • aspirin 81 MG EC tablet Take 81 mg by mouth Daily.     • Calcium Carbonate-Vitamin D (CALCIUM 500 + D PO) Take  by mouth.     • cetirizine (zyrTEC) 10 MG tablet      • CloNIDine (CATAPRES) 0.1 MG tablet TAKE 1 TABLET BY MOUTH EVERY 12 HOURS (Patient taking differently: Take 0.1 mg by mouth 2 (Two) Times a Day.) 180 tablet 2   • clopidogrel (PLAVIX) 75 MG tablet Take 1 tablet by mouth Daily. 90 tablet 3   • Coenzyme Q10 (CO Q-10) 400 MG capsule Take 100 mg by mouth Daily.     • dicyclomine (BENTYL) 10 MG capsule Take 10 mg by mouth As Needed.     • doxycycline (VIBRAMYCIN) 100 MG capsule Take 100 mg by mouth Daily.  2   • estradiol (ESTRACE) 1 MG tablet Take 1 tablet by mouth Daily. 90 tablet 1   • fluconazole (DIFLUCAN) 200 MG tablet TAKE 1 TABLET  "BY MOUTH DAILY AS NEEDED.     • fluticasone (FLONASE) 50 MCG/ACT nasal spray      • furosemide (LASIX) 20 MG tablet Take 1 tablet by mouth Daily. 90 tablet 3   • Glucosamine-Chondroitin (OSTEO BI-FLEX REGULAR STRENGTH PO) Take  by mouth.     • isosorbide mononitrate (IMDUR) 30 MG 24 hr tablet Take 1 tablet by mouth Daily. 90 tablet 3   • labetalol (NORMODYNE) 200 MG tablet Take 1 tablet by mouth Every 12 (Twelve) Hours. 180 tablet 3   • lansoprazole (PREVACID) 30 MG capsule Take 30 mg by mouth Daily.     • latanoprost (XALATAN) 0.005 % ophthalmic solution Administer 1 drop to both eyes Every Night. 7.5 mL 3   • levothyroxine (SYNTHROID, LEVOTHROID) 100 MCG tablet Take 100 mcg by mouth Daily.     • Multiple Vitamins-Minerals (MULTIVITAMIN ADULT PO) Take  by mouth.     • nitroglycerin (NITROSTAT) 0.4 MG SL tablet 1 under the tongue as needed for angina, may repeat q5mins for up three doses 30 tablet 3   • olmesartan (BENICAR) 20 MG tablet Take 2 tablets by mouth Daily. (Patient taking differently: Take 40 mg by mouth Daily. Pt is take 1 tablet every morning.) 30 tablet 6   • Omega-3 Fatty Acids (FISH OIL) 1000 MG capsule capsule Take  by mouth Daily With Breakfast.     • rosuvastatin (CRESTOR) 10 MG tablet Take 1 tablet by mouth Daily. 90 tablet 2   • spironolactone (ALDACTONE) 25 MG tablet Take 1 tablet by mouth Daily. 90 tablet 2   • vitamin C (ASCORBIC ACID) 500 MG tablet Take 500 mg by mouth Daily.     • vitamin E 400 UNIT capsule vitamin E (dl, acetate) 180 mg (400 unit) capsule   Take by oral route.     • olmesartan (BENICAR) 40 MG tablet        No current facility-administered medications for this visit.         OBJECTIVE    /72 (BP Location: Left arm, Patient Position: Sitting, Cuff Size: Adult)   Pulse 52   Temp 96.8 °F (36 °C)   Ht 162.6 cm (64.02\")   Wt 108 kg (237 lb)   SpO2 99%   BMI 40.66 kg/m²         Review of Systems : The following systems are reviewed and changes noted as indicated under " history of present illness    Constitutional:  Denies recent weight loss, weight gain, fever or chills, no change in exercise tolerance     HENT:  Denies any hearing loss, epistaxis, hoarseness, or difficulty speaking.     Eyes: Wears eyeglasses or contact lenses     Respiratory:  Denies dyspnea with exertion,no cough, wheezing, or hemoptysis.     Cardiovascular: No chest pain, shortness of breath or palpitation.  Occasional dizziness especially when she stands up quickly lasting for a few seconds.    Gastrointestinal:  Denies change in bowel habits, dyspepsia, ulcer disease, hematochezia, or melena.     Endocrine: Negative for cold intolerance, heat intolerance, polydipsia, polyphagia and polyuria.  Hypothyroidism    Genitourinary: Negative.      Musculoskeletal: DJD    Neurological:  Denies any history of recurrent headaches, strokes, TIA, or seizure disorder.     Hematological: Denies any food allergies, seasonal allergies, bleeding disorders, or lymphadenopathy.     Psychiatric/Behavioral: Denies any history of depression, substance abuse, or change in cognitive function.         Physical Exam : The following systems reviewed and no changes noted    Constitutional: Cooperative, alert and oriented,  in no acute distress.     HENT:   Head: Normocephalic, normal hair patterns, no masses or tenderness.  Ears, Nose, and Throat: No gross abnormalities. No pallor or cyanosis.  Eyes: EOMS intact, PERRL, conjunctivae and lids unremarkable. Fundoscopic exam and visual fields not performed.   Neck: No palpable masses or adenopathy, no thyromegaly, no JVD, carotid pulses are full and equal bilaterally and without  Bruits.     Cardiovascular: Regular rhythm, S1 and S2 normal, no S3 or S4.  No murmurs, gallops, or rubs detected.     Pulmonary/Chest: Chest: normal symmetry, no tenderness to palpation, normal respiratory excursion, no intercostal retraction, no use of accessory muscles.            Pulmonary: Normal breath  sounds. No rales or ronchi.    Abdominal: Abdomen soft, bowel sounds normoactive, no masses, no hepatosplenomegaly, non-tender, no bruits.     Musculoskeletal: No deformities, clubbing, cyanosis, erythema, or edema observed.     Neurological: No gross motor or sensory deficits noted, affect appropriate, oriented to time, person, place.     Skin: Warm and dry to the touch, no apparent skin lesions or masses noted.     Psychiatric: She has a normal mood and affect. Her behavior is normal. Judgment and thought content normal.         Procedures      Lab Results   Component Value Date    WBC 6.1 09/16/2019    HGB 11.9 (L) 09/16/2019    HCT 37.1 (L) 09/16/2019    MCV 90 09/16/2019     09/16/2019     Lab Results   Component Value Date    BUN 28 (H) 04/20/2018    CREATININE 1 04/20/2018    CALCIUM 9.3 04/20/2018    ALBUMIN 3.9 04/20/2018    AST 12 (L) 04/20/2018    ALT 31 04/20/2018     Lab Results   Component Value Date    CHOL 162 05/28/2021    CHOL 145 06/29/2020    CHOL 154 09/16/2019     Lab Results   Component Value Date    TRIG 65 05/28/2021    TRIG 98 06/29/2020    TRIG 159 (H) 09/16/2019     Lab Results   Component Value Date    HDL 48 05/28/2021    HDL 40 06/29/2020    HDL 35 (L) 09/16/2019     No components found for: LDLCALC  Lab Results   Component Value Date    LDL 91 05/28/2021    LDL 82 06/29/2020    LDL 84 09/16/2019     No results found for: HDLLDLRATIO  No components found for: CHOLHDL  Lab Results   Component Value Date    HGBA1C 5.3 05/28/2021     Lab Results   Component Value Date    TSH 1.12 04/20/2018           ASSESSMENT AND PLAN  Mr. Joseph has multiple medical issues including hypertension, obesity, hyperlipidemia, previous coronary artery disease with PCI to right coronary artery in 2011 at [place].  She is progressing well with no clinical evidence of progression of coronary artery disease.  She does however have occasional dizziness the exact etiology of which is unclear.  Mild  postural changes in blood pressure is possible.  Symptomatic bradycardia is less likely.  However, since her heart rate is low normal I have decreased the dose of labetalol to 100 mg twice a day.  I have asked her to keep a close watch on her heart rate and blood pressure.     I have continued antiplatelet therapy with aspirin and Plavix, antihypertensive therapy with clonidine, labetalol, Benicar, Aldactone and lipid-lowering therapy with Crestor and antianginal therapy with isosorbide mononitrate.  If her blood pressure is elevated, we could consider adding amlodipine.    Diagnoses and all orders for this visit:    1. Coronary artery disease involving native coronary artery of native heart without angina pectoris (Primary)  -     ECG 12 Lead    2. Essential hypertension    3. Mixed hyperlipidemia    4. Class 3 severe obesity due to excess calories with body mass index (BMI) of 40.0 to 44.9 in adult, unspecified whether serious comorbidity present (HCC)        Patient's Body mass index is 40.66 kg/m². BMI is above normal parameters. Recommendations include: exercise counseling and nutrition counseling.  Patient is a non-smoker.    Joshua Ernandez MD  1/5/2022  10:25 CST

## 2022-01-07 ENCOUNTER — PATIENT MESSAGE (OUTPATIENT)
Dept: CARDIOLOGY | Facility: CLINIC | Age: 67
End: 2022-01-07

## 2022-01-07 RX ORDER — AMLODIPINE BESYLATE 5 MG/1
5 TABLET ORAL DAILY
Qty: 90 TABLET | Refills: 3 | Status: SHIPPED | OUTPATIENT
Start: 2022-01-07 | End: 2022-01-31 | Stop reason: SDUPTHER

## 2022-01-10 LAB
QT INTERVAL: 468 MS
QTC INTERVAL: 435 MS

## 2022-01-31 ENCOUNTER — TELEPHONE (OUTPATIENT)
Dept: CARDIOLOGY | Facility: CLINIC | Age: 67
End: 2022-01-31

## 2022-01-31 RX ORDER — AMLODIPINE BESYLATE 10 MG/1
10 TABLET ORAL DAILY
Qty: 60 TABLET | Refills: 0 | Status: SHIPPED | OUTPATIENT
Start: 2022-01-31 | End: 2022-04-01 | Stop reason: SDUPTHER

## 2022-01-31 RX ORDER — AMLODIPINE BESYLATE 10 MG/1
10 TABLET ORAL DAILY
Qty: 60 TABLET | Refills: 0 | Status: SHIPPED | OUTPATIENT
Start: 2022-01-31 | End: 2022-01-31 | Stop reason: SDUPTHER

## 2022-01-31 NOTE — TELEPHONE ENCOUNTER
Per Dr Garrison increase amlodipine 10 mg a night. New increase was sent to Brigido Harris ----- Message from Aleksandra Joseph sent at 1/31/2022  9:06 AM [facility] -----  Regarding: blood pressure  My meds were changed and I have been checking my BP and P and documenting it.  I have also had horrible headaches and they are noted on the attachments.  I was only allowed 3 attachments so I will send one more message with the final attachment.  My question is: I realize my pulse is rising which is good.  my BP has also risen and is high, can we lower the BP and still keep the pulse at it's rates?  my head hurts alot since we have changed my meds.

## 2022-03-18 ENCOUNTER — OFFICE VISIT (OUTPATIENT)
Dept: CARDIOLOGY | Facility: CLINIC | Age: 67
End: 2022-03-18

## 2022-03-18 VITALS
HEART RATE: 69 BPM | DIASTOLIC BLOOD PRESSURE: 62 MMHG | TEMPERATURE: 97.5 F | BODY MASS INDEX: 41.73 KG/M2 | SYSTOLIC BLOOD PRESSURE: 108 MMHG | OXYGEN SATURATION: 97 % | WEIGHT: 244.4 LBS | HEIGHT: 64 IN

## 2022-03-18 DIAGNOSIS — E78.2 MIXED HYPERLIPIDEMIA: ICD-10-CM

## 2022-03-18 DIAGNOSIS — I25.10 CORONARY ARTERY DISEASE INVOLVING NATIVE CORONARY ARTERY OF NATIVE HEART WITHOUT ANGINA PECTORIS: Primary | ICD-10-CM

## 2022-03-18 DIAGNOSIS — E66.01 CLASS 3 SEVERE OBESITY DUE TO EXCESS CALORIES WITH BODY MASS INDEX (BMI) OF 40.0 TO 44.9 IN ADULT, UNSPECIFIED WHETHER SERIOUS COMORBIDITY PRESENT: ICD-10-CM

## 2022-03-18 DIAGNOSIS — I10 ESSENTIAL HYPERTENSION: ICD-10-CM

## 2022-03-18 PROCEDURE — 99214 OFFICE O/P EST MOD 30 MIN: CPT | Performed by: INTERNAL MEDICINE

## 2022-03-18 RX ORDER — OLMESARTAN MEDOXOMIL 40 MG/1
40 TABLET ORAL DAILY
COMMUNITY

## 2022-03-18 RX ORDER — CHLORTHALIDONE 25 MG/1
25 TABLET ORAL DAILY
Qty: 30 TABLET | Refills: 6 | Status: SHIPPED | OUTPATIENT
Start: 2022-03-18 | End: 2022-05-31

## 2022-03-18 RX ORDER — MULTIPLE VITAMINS W/ MINERALS TAB 9MG-400MCG
TAB ORAL
COMMUNITY
Start: 2022-01-01

## 2022-03-18 NOTE — PROGRESS NOTES
Aleksandra Joseph  66 y.o. female      1. Coronary artery disease involving native coronary artery of native heart without angina pectoris    2. Essential hypertension    3. Mixed hyperlipidemia    4. Class 3 severe obesity due to excess calories with body mass index (BMI) of 40.0 to 44.9 in adult, unspecified whether serious comorbidity present (HCC)        History of Present Illness:  Aleksandra Joseph is here for follow-up of her above-stated problems sooner than expected.  She has history of coronary artery disease, hypertension, hyperlipidemia, hypothyroidism, obesity, DJD and she underwent PCI to right coronary artery in June 2011.    On her previous visit she was noted to have a heart rate of 52 bpm and hence I cut back on the dose of labetalol to 100 mg twice a day.  However, this increased her blood pressure and she was started on amlodipine.  This however did not optimize her blood pressure.  She has been concerned about fluctuating blood pressures.  She brought several blood pressure readings for our review.  She has been compliant with all her medicines.  Heart rate is improved though.    Echocardiogram in December 2020 showed normal LV systolic function with an EF of 65% with no wall motion abnormalities.  No significant valvular normalities were noted.  Lexiscan Cardiolite stress test in January 2021 showed breast attenuation artifact with no evidence of reversible ischemia and normal ejection fraction of 75%.       Allergies   Allergen Reactions   • Adhesive Tape      bandaids   • Lortab [Hydrocodone-Acetaminophen] Unknown - Low Severity     unknown   • Naproxen      FACIAL SWELLING   • Tekturna [Aliskiren]      EXTREME EYE SWELLING   • Penicillins Rash         Past Medical History:   Diagnosis Date   • Allergic conjunctivitis    • Borderline glaucoma    • Carpal tunnel syndrome    • Disease of thyroid gland    • Diverticulitis of colon    • Drusen of optic disc    • Hemorrhoids    • Hyperlipidemia    •  Hypertension    • Ingrowing nail    • Vitreous detachment of right eye          Past Surgical History:   Procedure Laterality Date   • ANKLE SURGERY Right 08/14/1971    ORIF of trimalleolar fracture, right ankle   • CARDIAC CATHETERIZATION  08/06/1991    chest pain etiology undetermined. Hypertension   • CARPAL TUNNEL RELEASE  02/16/2012    Endoscopic carpal tunnel release. left wrist   • CATARACT EXTRACTION W/ INTRAOCULAR LENS IMPLANT Left 7/31/2020    Procedure: REMOVE CATARACT AND IMPLANT INTRAOCULAR LENS;  Surgeon: Timothy Portillo MD;  Location: [facility] OR;  Service: Ophthalmology;  Laterality: Left;   • CATARACT EXTRACTION W/ INTRAOCULAR LENS IMPLANT Right 8/7/2020    Procedure: REMOVE CATARACT AND IMPLANT INTRAOCULAR LENS;  Surgeon: Timothy Portillo MD;  Location: [facility] OR;  Service: Ophthalmology;  Laterality: Right;   • CHOLECYSTECTOMY  09/26/2001    biliary colic   • COLONOSCOPY  11/25/1997    Mild diverticulosis of sigmoid colo w/ no evidence of diverticulitis. Internal hemorrhoidsHematochezia suspected related to internal hemorrhoids   • COLONOSCOPY N/A 9/16/2020    Procedure: COLONOSCOPY;  Surgeon: Eren Byers DO;  Location: [facility] ENDOSCOPY;  Service: Gastroenterology;  Laterality: N/A;   • CORONARY STENT PLACEMENT     • DILATATION AND CURETTAGE  01/06/1984    dysfunctional uterine bleeding   • EXCISION LESION  07/18/2002    Shave excision, right superior earlobe. Intradermal nevus, symptomatic, right superior earlobe   • HAND SURGERY  12/18/1971    excision of ganglion cyst, right palm.   • HYSTERECTOMY  11/20/1992    laparoscopically assisted vaginal hysterectomy & RSO. Pelvic pain, dyspareunia, menorrhagia, dysmenorrhea. Same plus? endometriosis of right ovary   • KNEE ARTHROSCOPY  12/11/2012    Arthroscopy, medial & lateral meniscectomy, partial posteriorly w/ chondroplasty of the patella. Torn medial meniscus right knee   • NAIL BED REMOVAL/REVISION  01/18/1996    Negative for  exercise induced ischemia at a level of 87% of the maximum predicted HR. Abnormal blood pressure response to exercise. Functional Class I   • PAP SMEAR  05/02/1995    NORMAL   • REPLACEMENT TOTAL KNEE Left          Family History   Problem Relation Age of Onset   • Breast cancer Other    • Colon cancer Other    • Diabetes Other    • Heart disease Other    • Hypertension Other    • Thyroid disease Other    • Colon cancer Father    • Breast cancer Mother          Social History     Socioeconomic History   • Marital status:    Tobacco Use   • Smoking status: Never Smoker   • Smokeless tobacco: Never Used   Substance and Sexual Activity   • Alcohol use: No   • Drug use: No   • Sexual activity: Not Currently     Birth control/protection: Abstinence, Post-menopausal         Current Outpatient Medications   Medication Sig Dispense Refill   • amLODIPine (NORVASC) 10 MG tablet Take 1 tablet by mouth Daily. Take 1 tablet by mouth daily in PM 60 tablet 0   • aspirin 81 MG EC tablet Take 81 mg by mouth Daily.     • Calcium Carbonate-Vitamin D (CALCIUM 500 + D PO) Take  by mouth.     • cetirizine (zyrTEC) 10 MG tablet      • CloNIDine (CATAPRES) 0.1 MG tablet TAKE 1 TABLET BY MOUTH EVERY 12 HOURS (Patient taking differently: Take 0.1 mg by mouth 2 (Two) Times a Day.) 180 tablet 2   • clopidogrel (PLAVIX) 75 MG tablet Take 1 tablet by mouth Daily. 90 tablet 3   • Coenzyme Q10 (CO Q-10) 400 MG capsule Take 100 mg by mouth Daily.     • dicyclomine (BENTYL) 10 MG capsule Take 10 mg by mouth As Needed.     • doxycycline (VIBRAMYCIN) 100 MG capsule Take 100 mg by mouth Daily.  2   • estradiol (ESTRACE) 1 MG tablet Take 1 tablet by mouth Daily. 90 tablet 1   • fluconazole (DIFLUCAN) 200 MG tablet TAKE 1 TABLET BY MOUTH DAILY AS NEEDED.     • fluticasone (FLONASE) 50 MCG/ACT nasal spray      • Glucosamine-Chondroitin (OSTEO BI-FLEX REGULAR STRENGTH PO) Take  by mouth.     • isosorbide mononitrate (IMDUR) 30 MG 24 hr tablet Take 1  "tablet by mouth Daily. 90 tablet 3   • labetalol (NORMODYNE) 200 MG tablet Take 0.5 tablets by mouth Every 12 (Twelve) Hours. 180 tablet 3   • lansoprazole (PREVACID) 30 MG capsule Take 30 mg by mouth Daily.     • latanoprost (XALATAN) 0.005 % ophthalmic solution Administer 1 drop to both eyes Every Night. 7.5 mL 3   • levothyroxine (SYNTHROID, LEVOTHROID) 100 MCG tablet Take 100 mcg by mouth Daily.     • multivitamin with minerals tablet tablet      • nitroglycerin (NITROSTAT) 0.4 MG SL tablet 1 under the tongue as needed for angina, may repeat q5mins for up three doses 30 tablet 3   • olmesartan (Benicar) 40 MG tablet Take 40 mg by mouth Daily.     • Omega-3 Fatty Acids (FISH OIL) 1000 MG capsule capsule Take  by mouth Daily With Breakfast.     • rosuvastatin (CRESTOR) 10 MG tablet Take 1 tablet by mouth Daily. 90 tablet 2   • spironolactone (ALDACTONE) 25 MG tablet Take 1 tablet by mouth Daily. 90 tablet 2   • vitamin C (ASCORBIC ACID) 500 MG tablet Take 500 mg by mouth Daily.     • vitamin E 400 UNIT capsule vitamin E (dl, acetate) 180 mg (400 unit) capsule   Take by oral route.     • chlorthalidone (HYGROTON) 25 MG tablet Take 1 tablet by mouth Daily. 30 tablet 6     No current facility-administered medications for this visit.         OBJECTIVE    /62   Pulse 69   Temp 97.5 °F (36.4 °C)   Ht 162.6 cm (64.02\")   Wt 111 kg (244 lb 6.4 oz)   SpO2 97%   BMI 41.92 kg/m²         Review of Systems : The following systems are reviewed and changes noted as indicated under history of present illness    Constitutional:  Denies recent weight loss, weight gain, fever or chills, no change in exercise tolerance     HENT:  Denies any hearing loss, epistaxis, hoarseness, or difficulty speaking.     Eyes: Wears eyeglasses or contact lenses     Respiratory:  Denies dyspnea with exertion,no cough, wheezing, or hemoptysis.     Cardiovascular: No chest pain, shortness of breath or palpitation.     Gastrointestinal:  " Denies change in bowel habits, dyspepsia, ulcer disease, hematochezia, or melena.     Endocrine: Negative for cold intolerance, heat intolerance, polydipsia, polyphagia and polyuria.  Hypothyroidism    Genitourinary: Negative.      Musculoskeletal: DJD    Neurological:  Denies any history of recurrent headaches, strokes, TIA, or seizure disorder.     Hematological: Denies any food allergies, seasonal allergies, bleeding disorders, or lymphadenopathy.     Psychiatric/Behavioral: Denies any history of depression, substance abuse, or change in cognitive function.         Physical Exam : The following systems reviewed and no changes noted    Constitutional: Cooperative, alert and oriented,  in no acute distress.     HENT:   Head: Normocephalic, normal hair patterns, no masses or tenderness.  Ears, Nose, and Throat: No gross abnormalities. No pallor or cyanosis.  Eyes: EOMS intact, PERRL, conjunctivae and lids unremarkable. Fundoscopic exam and visual fields not performed.   Neck: No palpable masses or adenopathy, no thyromegaly, no JVD, carotid pulses are full and equal bilaterally and without  Bruits.     Cardiovascular: Regular rhythm, S1 and S2 normal, no S3 or S4.  No murmurs, gallops, or rubs detected.     Pulmonary/Chest: Chest: normal symmetry, no tenderness to palpation, normal respiratory excursion, no intercostal retraction, no use of accessory muscles.            Pulmonary: Normal breath sounds. No rales or ronchi.    Abdominal: Abdomen soft, bowel sounds normoactive, no masses, no hepatosplenomegaly, non-tender, no bruits.     Musculoskeletal: No deformities, clubbing, cyanosis, erythema, or edema observed.     Neurological: No gross motor or sensory deficits noted, affect appropriate, oriented to time, person, place.     Skin: Warm and dry to the touch, no apparent skin lesions or masses noted.     Psychiatric: She has a normal mood and affect. Her behavior is normal. Judgment and thought content normal.          Procedures      Lab Results   Component Value Date    WBC 6.1 09/16/2019    HGB 11.9 (L) 09/16/2019    HCT 37.1 (L) 09/16/2019    MCV 90 09/16/2019     09/16/2019     Lab Results   Component Value Date    BUN 28 (H) 04/20/2018    CREATININE 1 04/20/2018    CALCIUM 9.3 04/20/2018    ALBUMIN 3.9 04/20/2018    AST 12 (L) 04/20/2018    ALT 31 04/20/2018     Lab Results   Component Value Date    CHOL 162 05/28/2021    CHOL 145 06/29/2020    CHOL 154 09/16/2019     Lab Results   Component Value Date    TRIG 65 05/28/2021    TRIG 98 06/29/2020    TRIG 159 (H) 09/16/2019     Lab Results   Component Value Date    HDL 48 05/28/2021    HDL 40 06/29/2020    HDL 35 (L) 09/16/2019     No components found for: LDLCALC  Lab Results   Component Value Date    LDL 91 05/28/2021    LDL 82 06/29/2020    LDL 84 09/16/2019     No results found for: HDLLDLRATIO  No components found for: CHOLHDL  Lab Results   Component Value Date    HGBA1C 5.3 05/28/2021     Lab Results   Component Value Date    TSH 1.12 04/20/2018           ASSESSMENT AND PLAN  Mr. Joseph has multiple medical issues including hypertension, obesity, hyperlipidemia, previous coronary artery disease with PCI to right coronary artery in 2011 at [place].    After reviewing her medicines, I have started her on chlorthalidone 25 mg daily with hope that this will optimize her blood pressure and discontinued Lasix.  However, she will take Lasix only on a as needed basis.   I have continued antiplatelet therapy with aspirin and Plavix, antihypertensive therapy with clonidine, labetalol, Benicar, amlodipine, Aldactone and lipid-lowering therapy with Crestor and antianginal therapy with isosorbide mononitrate.     Diagnoses and all orders for this visit:    1. Coronary artery disease involving native coronary artery of native heart without angina pectoris (Primary)    2. Essential hypertension    3. Mixed hyperlipidemia    4. Class 3 severe obesity due to excess  calories with body mass index (BMI) of 40.0 to 44.9 in adult, unspecified whether serious comorbidity present (HCC)    Other orders  -     chlorthalidone (HYGROTON) 25 MG tablet; Take 1 tablet by mouth Daily.  Dispense: 30 tablet; Refill: 6        Patient's Body mass index is 41.92 kg/m². BMI is above normal parameters. Recommendations include: exercise counseling and nutrition counseling.  Patient is a non-smoker.    Joshua Ernandez MD  3/18/2022  15:24 CDT

## 2022-04-01 RX ORDER — AMLODIPINE BESYLATE 10 MG/1
10 TABLET ORAL DAILY
Qty: 90 TABLET | Refills: 3 | Status: SHIPPED | OUTPATIENT
Start: 2022-04-01 | End: 2022-05-17

## 2022-04-01 RX ORDER — ROSUVASTATIN CALCIUM 10 MG/1
10 TABLET, COATED ORAL DAILY
Qty: 90 TABLET | Refills: 3 | Status: SHIPPED | OUTPATIENT
Start: 2022-04-01 | End: 2023-01-13 | Stop reason: SDUPTHER

## 2022-04-08 ENCOUNTER — TELEPHONE (OUTPATIENT)
Dept: CARDIOLOGY | Facility: CLINIC | Age: 67
End: 2022-04-08

## 2022-04-08 RX ORDER — METOPROLOL TARTRATE 50 MG/1
50 TABLET, FILM COATED ORAL 2 TIMES DAILY
Qty: 180 TABLET | Refills: 3 | Status: SHIPPED | OUTPATIENT
Start: 2022-04-08 | End: 2022-05-03 | Stop reason: SDUPTHER

## 2022-04-08 NOTE — TELEPHONE ENCOUNTER
I need to have something changed.  I know yesterday I said I would wait for Dr. Chao but I cannot go another week feeling the way I do.  The new medication chlorthalidone 25mg was given to me 3/18/22 to replace the  lasix 20mg.  and to help the bp drop a little more.  My pulse is dropping back into the 50's and that is what he was trying to raise.  Yes, my bp is down but my pulse is dropping.  I'm dizzy and feel terrible.  could the chlorthalidone be reduced to the 15mg and we can see what that does.      I'm sorry to bother you but I truly feel terrible.    Per EVONNE Berry  Continue Chlorthalidone 25 mg, this is the lowest dose, stop labetalol, and start metoprolo tartrate 50 mg BID. New medication was sent to her pharmacy in her chart.

## 2022-04-15 RX ORDER — FUROSEMIDE 20 MG/1
20 TABLET ORAL DAILY
Qty: 90 TABLET | Refills: 3 | Status: SHIPPED | OUTPATIENT
Start: 2022-04-15

## 2022-05-17 ENCOUNTER — OFFICE VISIT (OUTPATIENT)
Dept: CARDIOLOGY | Facility: CLINIC | Age: 67
End: 2022-05-17

## 2022-05-17 VITALS
DIASTOLIC BLOOD PRESSURE: 58 MMHG | HEIGHT: 64 IN | OXYGEN SATURATION: 98 % | WEIGHT: 244 LBS | SYSTOLIC BLOOD PRESSURE: 124 MMHG | HEART RATE: 56 BPM | BODY MASS INDEX: 41.66 KG/M2 | TEMPERATURE: 98 F

## 2022-05-17 DIAGNOSIS — E78.2 MIXED HYPERLIPIDEMIA: ICD-10-CM

## 2022-05-17 DIAGNOSIS — I10 ESSENTIAL HYPERTENSION: Primary | ICD-10-CM

## 2022-05-17 DIAGNOSIS — I25.10 CORONARY ARTERY DISEASE INVOLVING NATIVE CORONARY ARTERY OF NATIVE HEART WITHOUT ANGINA PECTORIS: ICD-10-CM

## 2022-05-17 PROCEDURE — 99214 OFFICE O/P EST MOD 30 MIN: CPT | Performed by: INTERNAL MEDICINE

## 2022-05-17 RX ORDER — AMLODIPINE BESYLATE 5 MG/1
TABLET ORAL
COMMUNITY
Start: 2022-04-06 | End: 2022-05-17

## 2022-05-17 RX ORDER — AMLODIPINE BESYLATE 10 MG/1
10 TABLET ORAL DAILY
Qty: 90 TABLET | Refills: 3 | Status: SHIPPED | OUTPATIENT
Start: 2022-05-17 | End: 2022-05-31

## 2022-05-17 RX ORDER — CETIRIZINE HYDROCHLORIDE 10 MG/1
10 TABLET ORAL DAILY
COMMUNITY
Start: 2022-04-27 | End: 2023-04-28

## 2022-05-17 NOTE — PROGRESS NOTES
Aleksandra Joseph  66 y.o. female      1. Essential hypertension    2. Coronary artery disease involving native coronary artery of native heart without angina pectoris    3. Mixed hyperlipidemia        History of Present Illness:  Aleksandra Joseph is here for follow-up of her above-stated problems sooner than expected.  She has history of coronary artery disease, hypertension, hyperlipidemia, hypothyroidism, obesity, DJD and she underwent PCI to right coronary artery in June 2011.    Fluctuating blood pressures have continued to be a problem and the patient did call our office in my absence and was started on metoprolol tartrate 25 mg twice a day by Sondra Berry and Lasix 20 mg daily was initiated.  Her blood pressure has continued to fluctuate and  she brought several blood pressure readings with systolics in the 150-160 range.    She does check her blood pressure several times a day and an elevated reading does make her anxious.  No chest pain, palpitation or dizziness was reported.  Her blood pressure was in the normal range at 124/58 and heart rate 56 bpm in our office today.    Allergies   Allergen Reactions   • Adhesive Tape      bandaids   • Lortab [Hydrocodone-Acetaminophen] Unknown - Low Severity     unknown   • Naproxen      FACIAL SWELLING   • Tekturna [Aliskiren]      EXTREME EYE SWELLING   • Penicillins Rash         Past Medical History:   Diagnosis Date   • Allergic conjunctivitis    • Borderline glaucoma    • Carpal tunnel syndrome    • Disease of thyroid gland    • Diverticulitis of colon    • Drusen of optic disc    • Hemorrhoids    • Hyperlipidemia    • Hypertension    • Ingrowing nail    • Vitreous detachment of right eye          Past Surgical History:   Procedure Laterality Date   • ANKLE SURGERY Right 08/14/1971    ORIF of trimalleolar fracture, right ankle   • CARDIAC CATHETERIZATION  08/06/1991    chest pain etiology undetermined. Hypertension   • CARPAL TUNNEL RELEASE  02/16/2012     Endoscopic carpal tunnel release. left wrist   • CATARACT EXTRACTION W/ INTRAOCULAR LENS IMPLANT Left 7/31/2020    Procedure: REMOVE CATARACT AND IMPLANT INTRAOCULAR LENS;  Surgeon: Timothy Portillo MD;  Location: [facility] OR;  Service: Ophthalmology;  Laterality: Left;   • CATARACT EXTRACTION W/ INTRAOCULAR LENS IMPLANT Right 8/7/2020    Procedure: REMOVE CATARACT AND IMPLANT INTRAOCULAR LENS;  Surgeon: Timothy Portillo MD;  Location: [facility] OR;  Service: Ophthalmology;  Laterality: Right;   • CHOLECYSTECTOMY  09/26/2001    biliary colic   • COLONOSCOPY  11/25/1997    Mild diverticulosis of sigmoid colo w/ no evidence of diverticulitis. Internal hemorrhoidsHematochezia suspected related to internal hemorrhoids   • COLONOSCOPY N/A 9/16/2020    Procedure: COLONOSCOPY;  Surgeon: Eren Byers DO;  Location: [facility] ENDOSCOPY;  Service: Gastroenterology;  Laterality: N/A;   • CORONARY STENT PLACEMENT     • DILATATION AND CURETTAGE  01/06/1984    dysfunctional uterine bleeding   • EXCISION LESION  07/18/2002    Shave excision, right superior earlobe. Intradermal nevus, symptomatic, right superior earlobe   • HAND SURGERY  12/18/1971    excision of ganglion cyst, right palm.   • HYSTERECTOMY  11/20/1992    laparoscopically assisted vaginal hysterectomy & RSO. Pelvic pain, dyspareunia, menorrhagia, dysmenorrhea. Same plus? endometriosis of right ovary   • KNEE ARTHROSCOPY  12/11/2012    Arthroscopy, medial & lateral meniscectomy, partial posteriorly w/ chondroplasty of the patella. Torn medial meniscus right knee   • NAIL BED REMOVAL/REVISION  01/18/1996    Negative for exercise induced ischemia at a level of 87% of the maximum predicted HR. Abnormal blood pressure response to exercise. Functional Class I   • PAP SMEAR  05/02/1995    NORMAL   • REPLACEMENT TOTAL KNEE Left          Family History   Problem Relation Age of Onset   • Breast cancer Other    • Colon cancer Other    • Diabetes Other    • Heart  disease Other    • Hypertension Other    • Thyroid disease Other    • Colon cancer Father    • Breast cancer Mother          Social History     Socioeconomic History   • Marital status:    Tobacco Use   • Smoking status: Never Smoker   • Smokeless tobacco: Never Used   Substance and Sexual Activity   • Alcohol use: No   • Drug use: No   • Sexual activity: Not Currently     Birth control/protection: Abstinence, Post-menopausal         Current Outpatient Medications   Medication Sig Dispense Refill   • amLODIPine (NORVASC) 5 MG tablet      • aspirin 81 MG EC tablet Take 81 mg by mouth Daily.     • Calcium Carbonate-Vitamin D (CALCIUM 500 + D PO) Take  by mouth.     • cetirizine (zyrTEC) 10 MG tablet Take 10 mg by mouth Daily.     • chlorthalidone (HYGROTON) 25 MG tablet Take 1 tablet by mouth Daily. 30 tablet 6   • CloNIDine (CATAPRES) 0.1 MG tablet TAKE 1 TABLET BY MOUTH EVERY 12 HOURS (Patient taking differently: Take 0.1 mg by mouth 2 (Two) Times a Day.) 180 tablet 2   • clopidogrel (PLAVIX) 75 MG tablet Take 1 tablet by mouth Daily. 90 tablet 3   • Coenzyme Q10 (CO Q-10) 400 MG capsule Take 100 mg by mouth Daily.     • dicyclomine (BENTYL) 10 MG capsule Take 10 mg by mouth As Needed.     • doxycycline (VIBRAMYCIN) 100 MG capsule Take 100 mg by mouth Daily.  2   • estradiol (ESTRACE) 1 MG tablet Take 1 tablet by mouth Daily. 90 tablet 1   • fluconazole (DIFLUCAN) 200 MG tablet TAKE 1 TABLET BY MOUTH DAILY AS NEEDED.     • fluticasone (FLONASE) 50 MCG/ACT nasal spray      • furosemide (LASIX) 20 MG tablet Take 1 tablet by mouth Daily. 90 tablet 3   • Glucosamine-Chondroitin (OSTEO BI-FLEX REGULAR STRENGTH PO) Take  by mouth.     • isosorbide mononitrate (IMDUR) 30 MG 24 hr tablet Take 1 tablet by mouth Daily. 90 tablet 3   • lansoprazole (PREVACID) 30 MG capsule Take 30 mg by mouth Daily.     • latanoprost (XALATAN) 0.005 % ophthalmic solution Administer 1 drop to both eyes Every Night. 7.5 mL 3   •  "levothyroxine (SYNTHROID, LEVOTHROID) 100 MCG tablet Take 100 mcg by mouth Daily.     • metoprolol tartrate (LOPRESSOR) 25 MG tablet Take 1 tablet by mouth 2 (Two) Times a Day. 90 tablet 3   • multivitamin with minerals tablet tablet      • nitroglycerin (NITROSTAT) 0.4 MG SL tablet 1 under the tongue as needed for angina, may repeat q5mins for up three doses 30 tablet 3   • olmesartan (BENICAR) 40 MG tablet Take 40 mg by mouth Daily.     • Omega-3 Fatty Acids (FISH OIL) 1000 MG capsule capsule Take  by mouth Daily With Breakfast.     • rosuvastatin (CRESTOR) 10 MG tablet Take 1 tablet by mouth Daily. 90 tablet 3   • spironolactone (ALDACTONE) 25 MG tablet Take 1 tablet by mouth Daily. 90 tablet 2   • vitamin C (ASCORBIC ACID) 500 MG tablet Take 500 mg by mouth Daily.     • vitamin E 400 UNIT capsule vitamin E (dl, acetate) 180 mg (400 unit) capsule   Take by oral route.       No current facility-administered medications for this visit.         OBJECTIVE    /58 (BP Location: Left arm, Patient Position: Sitting, Cuff Size: Adult)   Pulse 56   Temp 98 °F (36.7 °C)   Ht 162.6 cm (64.02\")   Wt 111 kg (244 lb)   SpO2 98%   BMI 41.86 kg/m²         Review of Systems : The following systems are reviewed and changes noted as indicated under history of present illness    Constitutional:  Denies recent weight loss, weight gain, fever or chills, no change in exercise tolerance     HENT:  Denies any hearing loss, epistaxis, hoarseness, or difficulty speaking.     Eyes: Wears eyeglasses or contact lenses     Respiratory:  Denies dyspnea with exertion,no cough, wheezing, or hemoptysis.     Cardiovascular: No chest pain, shortness of breath or palpitation.     Gastrointestinal:  Denies change in bowel habits, dyspepsia, ulcer disease, hematochezia, or melena.     Endocrine: Negative for cold intolerance, heat intolerance, polydipsia, polyphagia and polyuria.  Hypothyroidism    Genitourinary: Negative.    "   Musculoskeletal: DJD    Neurological:  Denies any history of recurrent headaches, strokes, TIA, or seizure disorder.     Hematological: Denies any food allergies, seasonal allergies, bleeding disorders, or lymphadenopathy.     Psychiatric/Behavioral: Denies any history of depression, substance abuse, or change in cognitive function.         Physical Exam : The following systems reviewed and no changes noted    Constitutional: Cooperative, alert and oriented,  in no acute distress.     HENT:   Head: Normocephalic, normal hair patterns, no masses or tenderness.  Ears, Nose, and Throat: No gross abnormalities. No pallor or cyanosis.  Eyes: EOMS intact, PERRL, conjunctivae and lids unremarkable. Fundoscopic exam and visual fields not performed.   Neck: No palpable masses or adenopathy, no thyromegaly, no JVD, carotid pulses are full and equal bilaterally and without  Bruits.     Cardiovascular: Regular rhythm, S1 and S2 normal, no S3 or S4.  No murmurs, gallops, or rubs detected.     Pulmonary/Chest: Chest: normal symmetry, no tenderness to palpation, normal respiratory excursion, no intercostal retraction, no use of accessory muscles.            Pulmonary: Normal breath sounds. No rales or ronchi.    Abdominal: Abdomen soft, bowel sounds normoactive, no masses, no hepatosplenomegaly, non-tender, no bruits.     Musculoskeletal: No deformities, clubbing, cyanosis, erythema, or edema observed.     Neurological: No gross motor or sensory deficits noted, affect appropriate, oriented to time, person, place.     Skin: Warm and dry to the touch, no apparent skin lesions or masses noted.     Psychiatric: She has a normal mood and affect. Her behavior is normal. Judgment and thought content normal.         Procedures      Lab Results   Component Value Date    WBC 6.1 09/16/2019    HGB 11.9 (L) 09/16/2019    HCT 37.1 (L) 09/16/2019    MCV 90 09/16/2019     09/16/2019     Lab Results   Component Value Date    BUN 21 (H)  09/16/2019    CREATININE 1.1 (H) 09/16/2019    CALCIUM 9 09/16/2019    ALBUMIN 3.9 09/16/2019    AST 13 (L) 09/16/2019    ALT 20 09/16/2019     Lab Results   Component Value Date    CHOL 162 05/28/2021    CHOL 145 06/29/2020    CHOL 154 09/16/2019     Lab Results   Component Value Date    TRIG 65 05/28/2021    TRIG 98 06/29/2020    TRIG 159 (H) 09/16/2019     Lab Results   Component Value Date    HDL 48 05/28/2021    HDL 40 06/29/2020    HDL 35 (L) 09/16/2019     No components found for: LDLCALC  Lab Results   Component Value Date    LDL 91 05/28/2021    LDL 82 06/29/2020    LDL 84 09/16/2019     No results found for: HDLLDLRATIO  No components found for: CHOLHDL  Lab Results   Component Value Date    HGBA1C 5.3 05/28/2021     Lab Results   Component Value Date    TSH 1.12 04/20/2018           ASSESSMENT AND PLAN  Mr. Joseph has multiple medical issues including hypertension, obesity, hyperlipidemia, previous coronary artery disease with PCI to right coronary artery in 2011 at [facility].      After reviewing her medicines in detail, I have made changes in the timing of medications.  Since her blood pressure seems to be elevated later in the day I have advised her to take isosorbide mononitrate with lunch and the dose of p.m. dose of amlodipine has been increased to 10 mg.  Benicar, lasix, spironolactone in a.m. and clonidine and metoprolol tartrate twice daily have been continued.  Antiplatelet therapy with aspirin Plavix and lipid-lowering therapy with Crestor have been continued.    She will let us know how she is doing with these changes.    Diagnoses and all orders for this visit:    1. Essential hypertension (Primary)    2. Coronary artery disease involving native coronary artery of native heart without angina pectoris    3. Mixed hyperlipidemia        Patient's Body mass index is 41.86 kg/m². BMI is above normal parameters. Recommendations include: exercise counseling and nutrition counseling.  Patient is a  non-smoker.    Addendum:   Patient called our office today indicating that she was having multiple side effects to metoprolol including feeling fatigued, blurring of vision and wanted to medication discontinue.  She also had swelling of the lower extremities and this has become unbearable with some degree of bruising.  She wished to go back on labetalol because is the one medication she has done very well with almost no side effects.    After long discussion with the patient and after reviewing the situation I have put her back on labetalol 200 mg twice a day with a full understanding that she could have some degree of bradycardia that led to changing the medication in December 2021.  She will keep a close watch on her heart rate.  Continue clonidine point 1 mg twice a day, Lasix 20 mg daily, isosorbide mononitrate 30 mg daily, Benicar 40 mg daily, Aldactone 25 mg daily.  Amlodipine has been discontinued.  She will let us know how her blood pressure is doing.      Joshua Ernandez MD  5/17/2022  12:07 CDT

## 2022-05-31 RX ORDER — LABETALOL 200 MG/1
200 TABLET, FILM COATED ORAL 2 TIMES DAILY
Qty: 180 TABLET | Refills: 3
Start: 2022-05-31 | End: 2022-09-09 | Stop reason: SDUPTHER

## 2022-06-08 ENCOUNTER — TELEPHONE (OUTPATIENT)
Dept: CARDIOLOGY | Facility: CLINIC | Age: 67
End: 2022-06-08

## 2022-06-08 NOTE — TELEPHONE ENCOUNTER
----- Message from Aleksandra Joseph sent at 6/8/2022  8:50 AM CDT -----  Regarding: Blood pressures and attachment  06/04    8am /74 Pluse 55  930am      /66 P 62 took meds  6:15 pm 129/58 P 62  10:15 pm 136/55 P70 took meds    6/5  7:40am /66 P 65 took meds  11am /61 P51  3:15 pm /59 P52  7pm /63 P 57 took med  10:20 pm /57 P66    06/07  7:45am /61 P 61 took meds  9:45am /63 P 52  11:38 am /60 P 54  1:40pm /59 P 49  5:50pm /58 P 51  Meds at 7pm

## 2022-06-29 DIAGNOSIS — R00.2 PALPITATIONS: Primary | ICD-10-CM

## 2022-07-05 ENCOUNTER — TELEPHONE (OUTPATIENT)
Dept: CARDIOLOGY | Facility: CLINIC | Age: 67
End: 2022-07-05

## 2022-07-05 NOTE — TELEPHONE ENCOUNTER
Called patient to let know to come in for a 2 week zywie and that Dr. Garrison wanted to increase her aladctone 25mg to once a day to see if that would help with her high blood pressure. Pt will be in tomorrow to get her monitor on

## 2022-07-21 RX ORDER — HYDRALAZINE HYDROCHLORIDE 25 MG/1
25 TABLET, FILM COATED ORAL TAKE AS DIRECTED
Qty: 60 TABLET | Refills: 6 | Status: SHIPPED | OUTPATIENT
Start: 2022-07-21 | End: 2023-01-27

## 2022-07-25 ENCOUNTER — OFFICE VISIT (OUTPATIENT)
Dept: CARDIOLOGY | Facility: CLINIC | Age: 67
End: 2022-07-25

## 2022-07-25 VITALS
WEIGHT: 240 LBS | SYSTOLIC BLOOD PRESSURE: 142 MMHG | HEIGHT: 64 IN | HEART RATE: 61 BPM | BODY MASS INDEX: 40.97 KG/M2 | TEMPERATURE: 98 F | OXYGEN SATURATION: 98 % | DIASTOLIC BLOOD PRESSURE: 70 MMHG

## 2022-07-25 DIAGNOSIS — E78.2 MIXED HYPERLIPIDEMIA: ICD-10-CM

## 2022-07-25 DIAGNOSIS — R00.1 BRADYCARDIA, SINUS: ICD-10-CM

## 2022-07-25 DIAGNOSIS — I10 ESSENTIAL HYPERTENSION: ICD-10-CM

## 2022-07-25 DIAGNOSIS — R06.09 DYSPNEA ON EXERTION: ICD-10-CM

## 2022-07-25 DIAGNOSIS — I25.10 CORONARY ARTERY DISEASE INVOLVING NATIVE CORONARY ARTERY OF NATIVE HEART WITHOUT ANGINA PECTORIS: Primary | ICD-10-CM

## 2022-07-25 PROCEDURE — 99213 OFFICE O/P EST LOW 20 MIN: CPT | Performed by: INTERNAL MEDICINE

## 2022-07-25 NOTE — PROGRESS NOTES
Aleksandra Joseph  67 y.o. female      1. Coronary artery disease involving native coronary artery of native heart without angina pectoris    2. Mixed hyperlipidemia    3. Dyspnea on exertion    4. Essential hypertension    5. Bradycardia, sinus        History of Present Illness:  Aleksandra Joseph is here for follow-up of her above-stated problems sooner than expected.  She has history of coronary artery disease, hypertension, hyperlipidemia, hypothyroidism, obesity, DJD and she underwent PCI to right coronary artery in June 2011.    The patient has had fluctuating heart rates and blood pressure and she had called her office on several occasions and medication adjustments were made.  She has had significant intolerance to multiple medications including metoprolol, amlodipine.  She called her office in June indicating that she was having episodes of slow heart rate, bradycardia, dizziness and hence an event monitor was performed and this showed:    She was evaluated for slow heart rate, bradycardia, dizziness and event monitoring earlier this month showed:  Result: Underlying rhythm was sinus with heart rate ranging from 42 bpm to 80 bpm with an average heart rate of 58 bpm.  There were 518 PACs and 6 PVCs, both of which were less than 1% of total beats.  No sustained supraventricular or ventricular arrhythmias noted.  No significant pauses identified.  There were 16 patient triggered events with symptoms that included chest discomfort, lightheadedness, fatigue and these correlated with predominantly sinus rhythm, sinus bradycardia with occasional ectopic beats.     Impression: Event monitor showing predominantly sinus rhythm with episodes of sinus bradycardia.  The slowest heart rate occurred at 1:28 AM on 7/11/2022 with a heart rate of 43 bpm.     I discussed the results of this test in detail with her and started her on hydralazine 25 mg to be taken on a as needed basis to optimize her blood pressure and this is  worked for her.    Presently she is on a combination of labetalol, olmesartan, clonidine, Imdur, hydralazine as needed, Lasix and this seems to work for her.  I do not see a definite indication for pacemaker at this time because her slow heart rates are mostly in the middle of the night.  After trying different medications, labetalol was the only one that she could tolerate well enough to keep her blood pressure under control and her heart rate has been low normal for the most part.  She wanted to go back on it after trying several others.  At this time she seems satisfied with combination of medicines.      Allergies   Allergen Reactions   • Adhesive Tape      bandaids   • Lortab [Hydrocodone-Acetaminophen] Unknown - Low Severity     unknown   • Naproxen      FACIAL SWELLING   • Tekturna [Aliskiren]      EXTREME EYE SWELLING   • Penicillins Rash         Past Medical History:   Diagnosis Date   • Allergic conjunctivitis    • Borderline glaucoma    • Carpal tunnel syndrome    • Disease of thyroid gland    • Diverticulitis of colon    • Drusen of optic disc    • Hemorrhoids    • Hyperlipidemia    • Hypertension    • Ingrowing nail    • Vitreous detachment of right eye          Past Surgical History:   Procedure Laterality Date   • ANKLE SURGERY Right 08/14/1971    ORIF of trimalleolar fracture, right ankle   • CARDIAC CATHETERIZATION  08/06/1991    chest pain etiology undetermined. Hypertension   • CARPAL TUNNEL RELEASE  02/16/2012    Endoscopic carpal tunnel release. left wrist   • CATARACT EXTRACTION W/ INTRAOCULAR LENS IMPLANT Left 7/31/2020    Procedure: REMOVE CATARACT AND IMPLANT INTRAOCULAR LENS;  Surgeon: Timothy Portillo MD;  Location: [facility];  Service: Ophthalmology;  Laterality: Left;   • CATARACT EXTRACTION W/ INTRAOCULAR LENS IMPLANT Right 8/7/2020    Procedure: REMOVE CATARACT AND IMPLANT INTRAOCULAR LENS;  Surgeon: Timothy Portillo MD;  Location: [facility] OR;  Service: Ophthalmology;   Laterality: Right;   • CHOLECYSTECTOMY  09/26/2001    biliary colic   • COLONOSCOPY  11/25/1997    Mild diverticulosis of sigmoid colo w/ no evidence of diverticulitis. Internal hemorrhoidsHematochezia suspected related to internal hemorrhoids   • COLONOSCOPY N/A 9/16/2020    Procedure: COLONOSCOPY;  Surgeon: Eren Byers DO;  Location: [facility] ENDOSCOPY;  Service: Gastroenterology;  Laterality: N/A;   • CORONARY STENT PLACEMENT     • DILATATION AND CURETTAGE  01/06/1984    dysfunctional uterine bleeding   • EXCISION LESION  07/18/2002    Shave excision, right superior earlobe. Intradermal nevus, symptomatic, right superior earlobe   • HAND SURGERY  12/18/1971    excision of ganglion cyst, right palm.   • HYSTERECTOMY  11/20/1992    laparoscopically assisted vaginal hysterectomy & RSO. Pelvic pain, dyspareunia, menorrhagia, dysmenorrhea. Same plus? endometriosis of right ovary   • KNEE ARTHROSCOPY  12/11/2012    Arthroscopy, medial & lateral meniscectomy, partial posteriorly w/ chondroplasty of the patella. Torn medial meniscus right knee   • NAIL BED REMOVAL/REVISION  01/18/1996    Negative for exercise induced ischemia at a level of 87% of the maximum predicted HR. Abnormal blood pressure response to exercise. Functional Class I   • PAP SMEAR  05/02/1995    NORMAL   • REPLACEMENT TOTAL KNEE Left          Family History   Problem Relation Age of Onset   • Breast cancer Other    • Colon cancer Other    • Diabetes Other    • Heart disease Other    • Hypertension Other    • Thyroid disease Other    • Colon cancer Father    • Breast cancer Mother          Social History     Socioeconomic History   • Marital status:    Tobacco Use   • Smoking status: Never Smoker   • Smokeless tobacco: Never Used   Substance and Sexual Activity   • Alcohol use: No   • Drug use: No   • Sexual activity: Not Currently     Birth control/protection: Abstinence, Post-menopausal         Current Outpatient Medications   Medication  Sig Dispense Refill   • aspirin 81 MG EC tablet Take 81 mg by mouth Daily.     • Calcium Carbonate-Vitamin D (CALCIUM 500 + D PO) Take  by mouth.     • cetirizine (zyrTEC) 10 MG tablet Take 10 mg by mouth Daily.     • CloNIDine (CATAPRES) 0.1 MG tablet TAKE 1 TABLET BY MOUTH EVERY 12 HOURS (Patient taking differently: Take 0.1 mg by mouth 2 (Two) Times a Day.) 180 tablet 2   • clopidogrel (PLAVIX) 75 MG tablet Take 1 tablet by mouth Daily. 90 tablet 3   • Coenzyme Q10 (CO Q-10) 400 MG capsule Take 100 mg by mouth Daily.     • dicyclomine (BENTYL) 10 MG capsule Take 10 mg by mouth As Needed.     • doxycycline (VIBRAMYCIN) 100 MG capsule Take 100 mg by mouth Daily.  2   • estradiol (ESTRACE) 1 MG tablet Take 1 tablet by mouth Daily. 90 tablet 1   • fluconazole (DIFLUCAN) 200 MG tablet TAKE 1 TABLET BY MOUTH DAILY AS NEEDED.     • fluticasone (FLONASE) 50 MCG/ACT nasal spray      • furosemide (LASIX) 20 MG tablet Take 1 tablet by mouth Daily. 90 tablet 3   • Glucosamine-Chondroitin (OSTEO BI-FLEX REGULAR STRENGTH PO) Take  by mouth.     • hydrALAZINE (APRESOLINE) 25 MG tablet Take 1 tablet by mouth Take As Directed. (Patient taking differently: Take 25 mg by mouth Take As Directed. Twice a day as needed) 60 tablet 6   • isosorbide mononitrate (IMDUR) 30 MG 24 hr tablet Take 1 tablet by mouth Daily. 90 tablet 3   • labetalol (NORMODYNE) 200 MG tablet Take 1 tablet by mouth 2 (Two) Times a Day. 180 tablet 3   • lansoprazole (PREVACID) 30 MG capsule Take 30 mg by mouth Daily.     • latanoprost (XALATAN) 0.005 % ophthalmic solution Administer 1 drop to both eyes Every Night. 7.5 mL 3   • levothyroxine (SYNTHROID, LEVOTHROID) 100 MCG tablet Take 100 mcg by mouth Daily.     • multivitamin with minerals tablet tablet      • nitroglycerin (NITROSTAT) 0.4 MG SL tablet 1 under the tongue as needed for angina, may repeat q5mins for up three doses 30 tablet 3   • olmesartan (BENICAR) 40 MG tablet Take 40 mg by mouth Daily.     •  "Omega-3 Fatty Acids (FISH OIL) 1000 MG capsule capsule Take  by mouth Daily With Breakfast.     • rosuvastatin (CRESTOR) 10 MG tablet Take 1 tablet by mouth Daily. 90 tablet 3   • spironolactone (ALDACTONE) 25 MG tablet Take 1 tablet by mouth Daily. 90 tablet 2   • vitamin C (ASCORBIC ACID) 500 MG tablet Take 500 mg by mouth Daily.     • vitamin E 400 UNIT capsule vitamin E (dl, acetate) 180 mg (400 unit) capsule   Take by oral route.       No current facility-administered medications for this visit.         OBJECTIVE    /70 (BP Location: Left arm, Patient Position: Sitting, Cuff Size: Adult)   Pulse 61   Temp 98 °F (36.7 °C)   Ht 162.6 cm (64.02\")   Wt 109 kg (240 lb)   SpO2 98%   BMI 41.17 kg/m²         Review of Systems : The following systems are reviewed and changes noted as indicated under history of present illness    Constitutional:  Denies recent weight loss, weight gain, fever or chills, no change in exercise tolerance     HENT:  Denies any hearing loss, epistaxis, hoarseness, or difficulty speaking.     Eyes: Wears eyeglasses or contact lenses     Respiratory:  Denies dyspnea with exertion,no cough, wheezing, or hemoptysis.     Cardiovascular: See HPI    Gastrointestinal:  Denies change in bowel habits, dyspepsia, ulcer disease, hematochezia, or melena.     Endocrine: Negative for cold intolerance, heat intolerance, polydipsia, polyphagia and polyuria.  Hypothyroidism    Genitourinary: Negative.      Musculoskeletal: DJD    Neurological:  Denies any history of recurrent headaches, strokes, TIA, or seizure disorder.     Hematological: Denies any food allergies, seasonal allergies, bleeding disorders, or lymphadenopathy.     Psychiatric/Behavioral: Denies any history of depression, substance abuse, or change in cognitive function.         Physical Exam : The following systems reviewed and no changes noted    Constitutional: Cooperative, alert and oriented,  in no acute distress.     HENT: "   Head: Normocephalic, normal hair patterns, no masses or tenderness.  Ears, Nose, and Throat: No gross abnormalities. No pallor or cyanosis.  Eyes: EOMS intact, PERRL, conjunctivae and lids unremarkable. Fundoscopic exam and visual fields not performed.   Neck: No palpable masses or adenopathy, no thyromegaly, no JVD, carotid pulses are full and equal bilaterally and without  Bruits.     Cardiovascular: Regular rhythm, S1 and S2 normal, no S3 or S4.  No murmurs, gallops, or rubs detected.     Pulmonary/Chest: Chest: normal symmetry, no tenderness to palpation, normal respiratory excursion, no intercostal retraction, no use of accessory muscles.            Pulmonary: Normal breath sounds. No rales or ronchi.    Abdominal: Abdomen soft, bowel sounds normoactive, no masses, no hepatosplenomegaly, non-tender, no bruits.     Musculoskeletal: No deformities, clubbing, cyanosis, erythema, or edema observed.     Neurological: No gross motor or sensory deficits noted, affect appropriate, oriented to time, person, place.     Skin: Warm and dry to the touch, no apparent skin lesions or masses noted.     Psychiatric: She has a normal mood and affect. Her behavior is normal. Judgment and thought content normal.         Procedures      Lab Results   Component Value Date    WBC 6.1 09/16/2019    HGB 11.9 (L) 09/16/2019    HCT 37.1 (L) 09/16/2019    MCV 90 09/16/2019     09/16/2019     Lab Results   Component Value Date    BUN 24 06/01/2022    CREATININE 0.8 06/01/2022    CO2 28 06/01/2022    CALCIUM 9.3 06/01/2022    ALBUMIN 4.2 06/01/2022    AST 15 06/01/2022    ALT 18 06/01/2022     Lab Results   Component Value Date    CHOL 162 05/28/2021    CHOL 145 06/29/2020    CHOL 154 09/16/2019     Lab Results   Component Value Date    TRIG 84 06/01/2022    TRIG 65 05/28/2021    TRIG 98 06/29/2020     Lab Results   Component Value Date    HDL 46 06/01/2022    HDL 48 05/28/2021    HDL 40 06/29/2020     No components found for:  LDLCALC  Lab Results   Component Value Date    LDL 83 06/01/2022    LDL 91 05/28/2021    LDL 82 06/29/2020     No results found for: HDLLDLRATIO  No components found for: CHOLHDL  Lab Results   Component Value Date    HGBA1C 5.7 06/01/2022     Lab Results   Component Value Date    TSH 0.84 06/01/2022           ASSESSMENT AND PLAN  Mr. Joseph has multiple medical issues including hypertension, obesity, hyperlipidemia, previous coronary artery disease with PCI to right coronary artery in 2011 at [place].  As described in the history of present illness, she was evaluated for multiple cardiac symptomatology and event monitor did not show any significant symptomatic bradycardia.    After reviewing her medicines in detail, Benicar, lasix, spironolactone in a.m. and clonidine, Imdur and labetalol have been continued.  She has tolerated hydralazine quite well and has been using a as needed basis depending on her blood pressure.  Antiplatelet therapy with aspirin Plavix and lipid-lowering therapy with Crestor have been continued.    I will discuss her case with Dr. Urban.    Diagnoses and all orders for this visit:    1. Coronary artery disease involving native coronary artery of native heart without angina pectoris (Primary)    2. Mixed hyperlipidemia    3. Dyspnea on exertion    4. Essential hypertension    5. Bradycardia, sinus        Patient's Body mass index is 41.17 kg/m². BMI is above normal parameters. Recommendations include: exercise counseling and nutrition counseling.  Patient is a non-smoker.    Joshua Ernandez MD  7/25/2022  10:54 CDT

## 2022-09-09 RX ORDER — LABETALOL 200 MG/1
200 TABLET, FILM COATED ORAL 2 TIMES DAILY
Qty: 180 TABLET | Refills: 3
Start: 2022-09-09 | End: 2023-01-13 | Stop reason: SDUPTHER

## 2022-10-14 RX ORDER — NITROGLYCERIN 0.4 MG/1
TABLET SUBLINGUAL
Qty: 30 TABLET | Refills: 3 | Status: SHIPPED | OUTPATIENT
Start: 2022-10-14

## 2022-11-02 DIAGNOSIS — I25.10 CORONARY ARTERY DISEASE INVOLVING NATIVE CORONARY ARTERY OF NATIVE HEART WITHOUT ANGINA PECTORIS: ICD-10-CM

## 2022-11-02 RX ORDER — ISOSORBIDE MONONITRATE 30 MG/1
30 TABLET, EXTENDED RELEASE ORAL 3 TIMES DAILY
Qty: 90 TABLET | Refills: 3 | Status: SHIPPED | OUTPATIENT
Start: 2022-11-02 | End: 2023-03-27

## 2023-01-13 RX ORDER — CLOPIDOGREL BISULFATE 75 MG/1
75 TABLET ORAL DAILY
Qty: 90 TABLET | Refills: 3 | Status: SHIPPED | OUTPATIENT
Start: 2023-01-13 | End: 2023-01-30 | Stop reason: SDUPTHER

## 2023-01-13 RX ORDER — ROSUVASTATIN CALCIUM 10 MG/1
10 TABLET, COATED ORAL DAILY
Qty: 90 TABLET | Refills: 3 | Status: SHIPPED | OUTPATIENT
Start: 2023-01-13 | End: 2023-02-28 | Stop reason: SDUPTHER

## 2023-01-13 RX ORDER — LABETALOL 200 MG/1
200 TABLET, FILM COATED ORAL 2 TIMES DAILY
Qty: 180 TABLET | Refills: 3
Start: 2023-01-13 | End: 2023-02-20 | Stop reason: SDUPTHER

## 2023-01-27 ENCOUNTER — OFFICE VISIT (OUTPATIENT)
Dept: CARDIOLOGY | Facility: CLINIC | Age: 68
End: 2023-01-27
Payer: MEDICARE

## 2023-01-27 VITALS
HEART RATE: 50 BPM | BODY MASS INDEX: 41.83 KG/M2 | SYSTOLIC BLOOD PRESSURE: 126 MMHG | OXYGEN SATURATION: 97 % | WEIGHT: 245 LBS | HEIGHT: 64 IN | TEMPERATURE: 96.1 F | DIASTOLIC BLOOD PRESSURE: 72 MMHG

## 2023-01-27 DIAGNOSIS — I10 ESSENTIAL HYPERTENSION: ICD-10-CM

## 2023-01-27 DIAGNOSIS — I25.10 CORONARY ARTERY DISEASE INVOLVING NATIVE CORONARY ARTERY OF NATIVE HEART WITHOUT ANGINA PECTORIS: Primary | ICD-10-CM

## 2023-01-27 DIAGNOSIS — R00.1 BRADYCARDIA, SINUS: ICD-10-CM

## 2023-01-27 DIAGNOSIS — E78.2 MIXED HYPERLIPIDEMIA: ICD-10-CM

## 2023-01-27 LAB
QT INTERVAL: 468 MS
QTC INTERVAL: 426 MS

## 2023-01-27 PROCEDURE — 99214 OFFICE O/P EST MOD 30 MIN: CPT | Performed by: INTERNAL MEDICINE

## 2023-01-27 PROCEDURE — 93000 ELECTROCARDIOGRAM COMPLETE: CPT | Performed by: INTERNAL MEDICINE

## 2023-01-27 NOTE — PROGRESS NOTES
Aleksandra Joseph  67 y.o. female      1. Coronary artery disease involving native coronary artery of native heart without angina pectoris    2. Essential hypertension    3. Mixed hyperlipidemia    4. Bradycardia, sinus        History of Present Illness:  Aleksandra Joseph is here for follow-up of her above-stated problems sooner than expected.  She has history of coronary artery disease, hypertension, hyperlipidemia, hypothyroidism, obesity, DJD and she underwent PCI to right coronary artery in June 2011.    She has had significant intolerance to multiple medications including metoprolol, amlodipine and she does not respond well to hydralazine.      She was evaluated for slow heart rate, bradycardia, dizziness and event monitoring earlier this month showed:  Result: Underlying rhythm was sinus with heart rate ranging from 42 bpm to 80 bpm with an average heart rate of 58 bpm.  There were 518 PACs and 6 PVCs, both of which were less than 1% of total beats.  No sustained supraventricular or ventricular arrhythmias noted.  No significant pauses identified.  There were 16 patient triggered events with symptoms that included chest discomfort, lightheadedness, fatigue and these correlated with predominantly sinus rhythm, sinus bradycardia with occasional ectopic beats.     Impression: Event monitor showing predominantly sinus rhythm with episodes of sinus bradycardia.  The slowest heart rate occurred at 1:28 AM on 7/11/2022 with a heart rate of 43 bpm.     Presently she is on a combination of labetalol, olmesartan, clonidine, Imdur, Lasix and this seems to work for her.  Blood pressure has been under reasonably good control and she is satisfied with it.  There is no dizziness or syncope.  She does have sinus bradycardia and there is a longstanding finding and does not seem to have caused any symptoms.  She denied any cardiac symptoms at the present time and overall feels quite well.    She takes care of her mother who has  mild dementia and this has been a source of stress.    EKG today showed sinus rhythm with sinus bradycardia with heart rate of 50 bpm.  QTc interval 426 ms.  Baseline artifact.      Allergies   Allergen Reactions   • Chlorthalidone Dizziness   • Adhesive Tape      bandaids   • Amlodipine Swelling     Face feet and legs    • Lortab [Hydrocodone-Acetaminophen] Unknown - Low Severity     unknown   • Metoprolol Tartrate Headache     Fatigue     • Naproxen      FACIAL SWELLING   • Tekturna [Aliskiren]      EXTREME EYE SWELLING   • Penicillins Rash         Past Medical History:   Diagnosis Date   • Allergic conjunctivitis    • Borderline glaucoma    • Carpal tunnel syndrome    • Disease of thyroid gland    • Diverticulitis of colon    • Drusen of optic disc    • Hemorrhoids    • Hyperlipidemia    • Hypertension    • Ingrowing nail    • Vitreous detachment of right eye          Past Surgical History:   Procedure Laterality Date   • ANKLE SURGERY Right 08/14/1971    ORIF of trimalleolar fracture, right ankle   • CARDIAC CATHETERIZATION  08/06/1991    chest pain etiology undetermined. Hypertension   • CARPAL TUNNEL RELEASE  02/16/2012    Endoscopic carpal tunnel release. left wrist   • CATARACT EXTRACTION W/ INTRAOCULAR LENS IMPLANT Left 7/31/2020    Procedure: REMOVE CATARACT AND IMPLANT INTRAOCULAR LENS;  Surgeon: Timothy Portillo MD;  Location: [facility];  Service: Ophthalmology;  Laterality: Left;   • CATARACT EXTRACTION W/ INTRAOCULAR LENS IMPLANT Right 8/7/2020    Procedure: REMOVE CATARACT AND IMPLANT INTRAOCULAR LENS;  Surgeon: Timothy Portillo MD;  Location: [facility];  Service: Ophthalmology;  Laterality: Right;   • CHOLECYSTECTOMY  09/26/2001    biliary colic   • COLONOSCOPY  11/25/1997    Mild diverticulosis of sigmoid colo w/ no evidence of diverticulitis. Internal hemorrhoidsHematochezia suspected related to internal hemorrhoids   • COLONOSCOPY N/A 9/16/2020    Procedure: COLONOSCOPY;  Surgeon:  Eren Byers, ;  Location: [facility];  Service: Gastroenterology;  Laterality: N/A;   • CORONARY STENT PLACEMENT     • DILATATION AND CURETTAGE  01/06/1984    dysfunctional uterine bleeding   • EXCISION LESION  07/18/2002    Shave excision, right superior earlobe. Intradermal nevus, symptomatic, right superior earlobe   • HAND SURGERY  12/18/1971    excision of ganglion cyst, right palm.   • HYSTERECTOMY  11/20/1992    laparoscopically assisted vaginal hysterectomy & RSO. Pelvic pain, dyspareunia, menorrhagia, dysmenorrhea. Same plus? endometriosis of right ovary   • KNEE ARTHROSCOPY  12/11/2012    Arthroscopy, medial & lateral meniscectomy, partial posteriorly w/ chondroplasty of the patella. Torn medial meniscus right knee   • NAIL BED REMOVAL/REVISION  01/18/1996    Negative for exercise induced ischemia at a level of 87% of the maximum predicted HR. Abnormal blood pressure response to exercise. Functional Class I   • PAP SMEAR  05/02/1995    NORMAL   • REPLACEMENT TOTAL KNEE Left          Family History   Problem Relation Age of Onset   • Breast cancer Other    • Colon cancer Other    • Diabetes Other    • Heart disease Other    • Hypertension Other    • Thyroid disease Other    • Colon cancer Father    • Breast cancer Mother          Social History     Socioeconomic History   • Marital status:    Tobacco Use   • Smoking status: Never     Passive exposure: Never   • Smokeless tobacco: Never   Substance and Sexual Activity   • Alcohol use: No   • Drug use: No   • Sexual activity: Not Currently     Birth control/protection: Abstinence, Post-menopausal         Current Outpatient Medications   Medication Sig Dispense Refill   • aspirin 81 MG EC tablet Take 81 mg by mouth Daily.     • Calcium Carbonate-Vitamin D (CALCIUM 500 + D PO) Take  by mouth.     • cetirizine (zyrTEC) 10 MG tablet Take 10 mg by mouth Daily.     • CloNIDine (CATAPRES) 0.1 MG tablet TAKE 1 TABLET BY MOUTH EVERY 12 HOURS  (Patient taking differently: Take 0.1 mg by mouth 2 (Two) Times a Day.) 180 tablet 2   • clopidogrel (PLAVIX) 75 MG tablet Take 1 tablet by mouth Daily. 90 tablet 3   • Coenzyme Q10 (CO Q-10) 400 MG capsule Take 100 mg by mouth Daily.     • dicyclomine (BENTYL) 10 MG capsule Take 10 mg by mouth As Needed.     • doxycycline (VIBRAMYCIN) 100 MG capsule Take 100 mg by mouth Daily.  2   • estradiol (ESTRACE) 1 MG tablet Take 1 tablet by mouth Daily. 90 tablet 1   • fluconazole (DIFLUCAN) 200 MG tablet TAKE 1 TABLET BY MOUTH DAILY AS NEEDED.     • fluticasone (FLONASE) 50 MCG/ACT nasal spray      • furosemide (LASIX) 20 MG tablet Take 1 tablet by mouth Daily. 90 tablet 3   • Glucosamine-Chondroitin (OSTEO BI-FLEX REGULAR STRENGTH PO) Take  by mouth.     • isosorbide mononitrate (IMDUR) 30 MG 24 hr tablet Take 1 tablet by mouth 3 (Three) Times a Day. (Patient taking differently: Take 30 mg by mouth 3 (Three) Times a Day. 30mg in am and 60 mg in pm) 90 tablet 3   • labetalol (NORMODYNE) 200 MG tablet Take 1 tablet by mouth 2 (Two) Times a Day. 180 tablet 3   • lansoprazole (PREVACID) 30 MG capsule Take 30 mg by mouth Daily.     • latanoprost (XALATAN) 0.005 % ophthalmic solution Administer 1 drop to both eyes Every Night. 7.5 mL 3   • levothyroxine (SYNTHROID, LEVOTHROID) 100 MCG tablet Take 100 mcg by mouth Daily.     • multivitamin with minerals tablet tablet      • nitroglycerin (NITROSTAT) 0.4 MG SL tablet 1 under the tongue as needed for angina, may repeat q5mins for up three doses 30 tablet 3   • olmesartan (BENICAR) 40 MG tablet Take 40 mg by mouth Daily.     • Omega-3 Fatty Acids (FISH OIL) 1000 MG capsule capsule Take  by mouth Daily With Breakfast.     • rosuvastatin (CRESTOR) 10 MG tablet Take 1 tablet by mouth Daily. 90 tablet 3   • spironolactone (ALDACTONE) 25 MG tablet Take 1 tablet by mouth Daily. 90 tablet 2   • vitamin C (ASCORBIC ACID) 500 MG tablet Take 500 mg by mouth Daily.     • vitamin E 400 UNIT  "capsule vitamin E (dl, acetate) 180 mg (400 unit) capsule   Take by oral route.       No current facility-administered medications for this visit.         OBJECTIVE    /72 (BP Location: Left arm, Patient Position: Sitting, Cuff Size: Adult)   Pulse 50   Temp 96.1 °F (35.6 °C)   Ht 162.6 cm (64.02\")   Wt 120 kg (265 lb)   SpO2 97%   BMI 45.46 kg/m²         Review of Systems : The following systems are reviewed and changes noted as indicated under history of present illness    Constitutional:  Denies recent weight loss, weight gain, fever or chills, no change in exercise tolerance     HENT:  Denies any hearing loss, epistaxis, hoarseness, or difficulty speaking.     Eyes: Wears eyeglasses or contact lenses     Respiratory:  Denies dyspnea with exertion,no cough, wheezing, or hemoptysis.     Cardiovascular: No chest pain, shortness of breath or palpitation at this time.  See HPI    Gastrointestinal:  Denies change in bowel habits, dyspepsia, ulcer disease, hematochezia, or melena.     Endocrine: Negative for cold intolerance, heat intolerance, polydipsia, polyphagia and polyuria.  Hypothyroidism    Genitourinary: Negative.      Musculoskeletal: DJD    Neurological:  Denies any history of recurrent headaches, strokes, TIA, or seizure disorder.     Hematological: Denies any food allergies, seasonal allergies, bleeding disorders, or lymphadenopathy.     Psychiatric/Behavioral: Denies any history of depression, substance abuse, or change in cognitive function.         Physical Exam : The following systems reviewed and no changes noted    Constitutional: Cooperative, alert and oriented,  in no acute distress.     HENT:   Head: Normocephalic, normal hair patterns, no masses or tenderness.  Ears, Nose, and Throat: No gross abnormalities. No pallor or cyanosis.  Eyes: EOMS intact, PERRL, conjunctivae and lids unremarkable. Fundoscopic exam and visual fields not performed.   Neck: No palpable masses or adenopathy, " no thyromegaly, no JVD, carotid pulses are full and equal bilaterally and without  Bruits.     Cardiovascular: Regular rhythm, S1 and S2 normal, no S3 or S4.  No murmurs, gallops, or rubs detected.     Pulmonary/Chest: Chest: normal symmetry, no tenderness to palpation, normal respiratory excursion, no intercostal retraction, no use of accessory muscles.            Pulmonary: Normal breath sounds. No rales or ronchi.    Abdominal: Abdomen soft, bowel sounds normoactive, no masses, no hepatosplenomegaly, non-tender, no bruits.     Musculoskeletal: No deformities, clubbing, cyanosis, erythema, or edema observed.     Neurological: No gross motor or sensory deficits noted, affect appropriate, oriented to time, person, place.     Skin: Warm and dry to the touch, no apparent skin lesions or masses noted.     Psychiatric: She has a normal mood and affect. Her behavior is normal. Judgment and thought content normal.         Procedures      Lab Results   Component Value Date    WBC 6.1 09/16/2019    HGB 11.9 (L) 09/16/2019    HCT 37.1 (L) 09/16/2019    MCV 90 09/16/2019     09/16/2019     Lab Results   Component Value Date    BUN 21 01/25/2023    CREATININE 0.8 01/25/2023    CO2 31 01/25/2023    CALCIUM 9.5 01/25/2023    ALBUMIN 4.3 01/25/2023    AST 15 01/25/2023    ALT 17 01/25/2023     Lab Results   Component Value Date    CHOL 162 05/28/2021    CHOL 145 06/29/2020    CHOL 154 09/16/2019     Lab Results   Component Value Date    TRIG 110 01/25/2023    TRIG 84 06/01/2022    TRIG 65 05/28/2021     Lab Results   Component Value Date    HDL 45 01/25/2023    HDL 46 06/01/2022    HDL 48 05/28/2021     No components found for: LDLCALC  Lab Results   Component Value Date    LDL 63 01/25/2023    LDL 83 06/01/2022    LDL 91 05/28/2021     No results found for: HDLLDLRATIO  No components found for: CHOLHDL  Lab Results   Component Value Date    HGBA1C 6.0 (H) 01/25/2023     Lab Results   Component Value Date    TSH 0.83  01/25/2023           ASSESSMENT AND PLAN  Mr. Joseph has multiple medical issues including hypertension, obesity, hyperlipidemia, previous coronary artery disease with PCI to right coronary artery in 2011 at [place].  She has documented asymptomatic sinus bradycardia.    fter reviewing her medicines in detail, Benicar, lasix, spironolactone, clonidine, Imdur and labetalol have been continued.  She takes 30 mg of Imdur in the morning and 60 mg in the evening but does adjust the dose depending on blood pressure.    Antiplatelet therapy with aspirin Plavix and lipid-lowering therapy with Crestor have been continued.    Her lab results from 1/25/2023 were reviewed and CMP was within normal limits.  LDL was 63 and HDL 45.    Diagnoses and all orders for this visit:    1. Coronary artery disease involving native coronary artery of native heart without angina pectoris (Primary)  -     ECG 12 Lead    2. Essential hypertension    3. Mixed hyperlipidemia    4. Bradycardia, sinus        Patient's Body mass index is 45.46 kg/m². BMI is above normal parameters. Recommendations include: exercise counseling and nutrition counseling.  Patient is a non-smoker.    Joshua Ernandez MD  1/27/2023  11:11 CST

## 2023-01-30 RX ORDER — CLOPIDOGREL BISULFATE 75 MG/1
75 TABLET ORAL DAILY
Qty: 90 TABLET | Refills: 3 | Status: SHIPPED | OUTPATIENT
Start: 2023-01-30

## 2023-02-20 RX ORDER — LABETALOL 200 MG/1
200 TABLET, FILM COATED ORAL 2 TIMES DAILY
Qty: 180 TABLET | Refills: 3
Start: 2023-02-20 | End: 2023-02-28 | Stop reason: SDUPTHER

## 2023-02-28 RX ORDER — LABETALOL 200 MG/1
200 TABLET, FILM COATED ORAL 2 TIMES DAILY
Qty: 180 TABLET | Refills: 3
Start: 2023-02-28

## 2023-02-28 RX ORDER — ROSUVASTATIN CALCIUM 10 MG/1
10 TABLET, COATED ORAL DAILY
Qty: 90 TABLET | Refills: 3 | Status: SHIPPED | OUTPATIENT
Start: 2023-02-28

## 2023-03-27 DIAGNOSIS — I25.10 CORONARY ARTERY DISEASE INVOLVING NATIVE CORONARY ARTERY OF NATIVE HEART WITHOUT ANGINA PECTORIS: ICD-10-CM

## 2023-03-27 RX ORDER — ISOSORBIDE MONONITRATE 30 MG/1
TABLET, EXTENDED RELEASE ORAL
Qty: 90 TABLET | Refills: 0 | Status: SHIPPED | OUTPATIENT
Start: 2023-03-27

## 2023-06-12 DIAGNOSIS — I25.10 CORONARY ARTERY DISEASE INVOLVING NATIVE CORONARY ARTERY OF NATIVE HEART WITHOUT ANGINA PECTORIS: ICD-10-CM

## 2023-06-12 RX ORDER — ISOSORBIDE MONONITRATE 30 MG/1
TABLET, EXTENDED RELEASE ORAL
Qty: 90 TABLET | Refills: 11 | Status: SHIPPED | OUTPATIENT
Start: 2023-06-12

## 2023-07-28 ENCOUNTER — OFFICE VISIT (OUTPATIENT)
Dept: CARDIOLOGY | Facility: CLINIC | Age: 68
End: 2023-07-28
Payer: MEDICARE

## 2023-07-28 VITALS
HEART RATE: 69 BPM | HEIGHT: 64 IN | OXYGEN SATURATION: 99 % | TEMPERATURE: 96.9 F | WEIGHT: 241.6 LBS | BODY MASS INDEX: 41.25 KG/M2 | SYSTOLIC BLOOD PRESSURE: 140 MMHG | DIASTOLIC BLOOD PRESSURE: 70 MMHG

## 2023-07-28 DIAGNOSIS — R00.1 BRADYCARDIA, SINUS: ICD-10-CM

## 2023-07-28 DIAGNOSIS — I25.10 CORONARY ARTERY DISEASE INVOLVING NATIVE CORONARY ARTERY OF NATIVE HEART WITHOUT ANGINA PECTORIS: Primary | ICD-10-CM

## 2023-07-28 DIAGNOSIS — E78.2 MIXED HYPERLIPIDEMIA: ICD-10-CM

## 2023-07-28 DIAGNOSIS — I10 ESSENTIAL HYPERTENSION: ICD-10-CM

## 2023-07-28 PROCEDURE — 3078F DIAST BP <80 MM HG: CPT | Performed by: INTERNAL MEDICINE

## 2023-07-28 PROCEDURE — 1159F MED LIST DOCD IN RCRD: CPT | Performed by: INTERNAL MEDICINE

## 2023-07-28 PROCEDURE — 3077F SYST BP >= 140 MM HG: CPT | Performed by: INTERNAL MEDICINE

## 2023-07-28 PROCEDURE — 1160F RVW MEDS BY RX/DR IN RCRD: CPT | Performed by: INTERNAL MEDICINE

## 2023-07-28 PROCEDURE — 99214 OFFICE O/P EST MOD 30 MIN: CPT | Performed by: INTERNAL MEDICINE

## 2023-07-28 RX ORDER — SPIRONOLACTONE 25 MG/1
25 TABLET ORAL 2 TIMES DAILY
Qty: 90 TABLET | Refills: 2
Start: 2023-07-28

## 2023-07-28 RX ORDER — ISOSORBIDE MONONITRATE 30 MG/1
30 TABLET, EXTENDED RELEASE ORAL 2 TIMES DAILY
Qty: 180 TABLET | Refills: 3
Start: 2023-07-28

## 2023-07-28 NOTE — PROGRESS NOTES
Aleksandra Joseph  68 y.o. female      1. Coronary artery disease involving native coronary artery of native heart without angina pectoris    2. Essential hypertension    3. Mixed hyperlipidemia    4. Bradycardia, sinus        History of Present Illness:  Aleksandra Joseph is here for follow-up of her above-stated problems.  She has history of coronary artery disease, hypertension, hyperlipidemia, hypothyroidism, obesity, DJD and she underwent PCI to right coronary artery in June 2011.    She has had significant intolerance to multiple medications including metoprolol, amlodipine and she does not respond well to hydralazine.      She was evaluated for slow heart rate, bradycardia, dizziness and event monitoring in July 2020 showed:  Result: Underlying rhythm was sinus with heart rate ranging from 42 bpm to 80 bpm with an average heart rate of 58 bpm.  There were 518 PACs and 6 PVCs, both of which were less than 1% of total beats.  No sustained supraventricular or ventricular arrhythmias noted.  No significant pauses identified.  There were 16 patient triggered events with symptoms that included chest discomfort, lightheadedness, fatigue and these correlated with predominantly sinus rhythm, sinus bradycardia with occasional ectopic beats.  Impression: Event monitor showing predominantly sinus rhythm with episodes of sinus bradycardia.  The slowest heart rate occurred at 1:28 AM on 7/11/2022 with a heart rate of 43 bpm.     She denied any cardiac symptoms with no chest pain, shortness of breath, palpitation or dizziness.  Her heart rate is also improved.  She is on a rather unusual combination of blood pressure medicine but this seems to be working well for her.  She is off clonidine.      Allergies   Allergen Reactions    Chlorthalidone Dizziness    Adhesive Tape      bandaids    Amlodipine Swelling     Face feet and legs     Lortab [Hydrocodone-Acetaminophen] Unknown - Low Severity     unknown    Metoprolol Tartrate  Headache     Fatigue      Naproxen      FACIAL SWELLING    Tekturna [Aliskiren]      EXTREME EYE SWELLING    Penicillins Rash         Past Medical History:   Diagnosis Date    Allergic conjunctivitis     Borderline glaucoma     Carpal tunnel syndrome     Disease of thyroid gland     Diverticulitis of colon     Drusen of optic disc     Hemorrhoids     Hyperlipidemia     Hypertension     Ingrowing nail     Vitreous detachment of right eye          Past Surgical History:   Procedure Laterality Date    ANKLE SURGERY Right 08/14/1971    ORIF of trimalleolar fracture, right ankle    CARDIAC CATHETERIZATION  08/06/1991    chest pain etiology undetermined. Hypertension    CARPAL TUNNEL RELEASE  02/16/2012    Endoscopic carpal tunnel release. left wrist    CATARACT EXTRACTION W/ INTRAOCULAR LENS IMPLANT Left 7/31/2020    Procedure: REMOVE CATARACT AND IMPLANT INTRAOCULAR LENS;  Surgeon: Timothy Portillo MD;  Location: [facility] OR;  Service: Ophthalmology;  Laterality: Left;    CATARACT EXTRACTION W/ INTRAOCULAR LENS IMPLANT Right 8/7/2020    Procedure: REMOVE CATARACT AND IMPLANT INTRAOCULAR LENS;  Surgeon: Timothy Portillo MD;  Location: [facility] OR;  Service: Ophthalmology;  Laterality: Right;    CHOLECYSTECTOMY  09/26/2001    biliary colic    COLONOSCOPY  11/25/1997    Mild diverticulosis of sigmoid colo w/ no evidence of diverticulitis. Internal hemorrhoidsHematochezia suspected related to internal hemorrhoids    COLONOSCOPY N/A 9/16/2020    Procedure: COLONOSCOPY;  Surgeon: Eren Byers DO;  Location: [facility] ENDOSCOPY;  Service: Gastroenterology;  Laterality: N/A;    CORONARY STENT PLACEMENT      DILATATION AND CURETTAGE  01/06/1984    dysfunctional uterine bleeding    EXCISION LESION  07/18/2002    Shave excision, right superior earlobe. Intradermal nevus, symptomatic, right superior earlobe    HAND SURGERY  12/18/1971    excision of ganglion cyst, right palm.    HYSTERECTOMY  11/20/1992     laparoscopically assisted vaginal hysterectomy & RSO. Pelvic pain, dyspareunia, menorrhagia, dysmenorrhea. Same plus? endometriosis of right ovary    KNEE ARTHROSCOPY  12/11/2012    Arthroscopy, medial & lateral meniscectomy, partial posteriorly w/ chondroplasty of the patella. Torn medial meniscus right knee    NAIL BED REMOVAL/REVISION  01/18/1996    Negative for exercise induced ischemia at a level of 87% of the maximum predicted HR. Abnormal blood pressure response to exercise. Functional Class I    PAP SMEAR  05/02/1995    NORMAL    REPLACEMENT TOTAL KNEE Left          Family History   Problem Relation Age of Onset    Breast cancer Other     Colon cancer Other     Diabetes Other     Heart disease Other     Hypertension Other     Thyroid disease Other     Colon cancer Father     Breast cancer Mother          Social History     Socioeconomic History    Marital status:    Tobacco Use    Smoking status: Never     Passive exposure: Never    Smokeless tobacco: Never   Substance and Sexual Activity    Alcohol use: No    Drug use: No    Sexual activity: Not Currently     Birth control/protection: Abstinence, Post-menopausal         Current Outpatient Medications   Medication Sig Dispense Refill    aspirin 81 MG EC tablet Take 1 tablet by mouth Daily.      Calcium Carbonate-Vitamin D (CALCIUM 500 + D PO) Take  by mouth.      clopidogrel (PLAVIX) 75 MG tablet Take 1 tablet by mouth Daily. 90 tablet 3    Coenzyme Q10 (CO Q-10) 400 MG capsule Take 100 mg by mouth Daily.      dicyclomine (BENTYL) 10 MG capsule Take 1 capsule by mouth As Needed.      doxycycline (VIBRAMYCIN) 100 MG capsule Take 1 capsule by mouth Daily.  2    empagliflozin (JARDIANCE) 10 MG tablet tablet Take 1 tablet by mouth Daily.      estradiol (ESTRACE) 1 MG tablet Take 1 tablet by mouth Daily. 90 tablet 1    fluconazole (DIFLUCAN) 200 MG tablet TAKE 1 TABLET BY MOUTH DAILY AS NEEDED.      fluticasone (FLONASE) 50 MCG/ACT nasal spray        "furosemide (LASIX) 20 MG tablet Take 1 tablet by mouth Daily. 90 tablet 3    Glucosamine-Chondroitin (OSTEO BI-FLEX REGULAR STRENGTH PO) Take  by mouth.      isosorbide mononitrate (IMDUR) 30 MG 24 hr tablet Take 1 tablet by mouth 2 (Two) Times a Day. Take 30 mg of Imdur in the morning and 60 mg in the evening  adjust the dose depending on blood pressure. 180 tablet 3    labetalol (NORMODYNE) 200 MG tablet Take 1 tablet by mouth 2 (Two) Times a Day. 180 tablet 3    lansoprazole (PREVACID) 30 MG capsule Take 1 capsule by mouth Daily.      latanoprost (XALATAN) 0.005 % ophthalmic solution Administer 1 drop to both eyes Every Night. 7.5 mL 3    levothyroxine (SYNTHROID, LEVOTHROID) 100 MCG tablet Take 1 tablet by mouth Daily.      multivitamin with minerals tablet tablet       nitroglycerin (NITROSTAT) 0.4 MG SL tablet 1 under the tongue as needed for angina, may repeat q5mins for up three doses 30 tablet 3    olmesartan (BENICAR) 40 MG tablet Take 1 tablet by mouth Daily.      Omega-3 Fatty Acids (FISH OIL) 1000 MG capsule capsule Take  by mouth Daily With Breakfast.      rosuvastatin (CRESTOR) 10 MG tablet Take 1 tablet by mouth Daily. 90 tablet 3    spironolactone (ALDACTONE) 25 MG tablet Take 1 tablet by mouth 2 (Two) Times a Day. 90 tablet 2    vitamin C (ASCORBIC ACID) 500 MG tablet Take 1 tablet by mouth Daily.      vitamin E 400 UNIT capsule vitamin E (dl, acetate) 180 mg (400 unit) capsule   Take by oral route.       No current facility-administered medications for this visit.         OBJECTIVE    /70 (BP Location: Left arm, Patient Position: Sitting, Cuff Size: Adult)   Pulse 69   Temp 96.9 °F (36.1 °C)   Ht 162.6 cm (64.02\")   Wt 110 kg (241 lb 9.6 oz)   SpO2 99%   BMI 41.44 kg/m²         Review of Systems : The following systems are reviewed and changes noted as indicated under history of present illness    Constitutional:  Denies recent weight loss, weight gain, fever or chills, no change in " exercise tolerance     HENT:  Denies any hearing loss, epistaxis, hoarseness, or difficulty speaking.     Eyes: Wears eyeglasses or contact lenses     Respiratory:  Denies dyspnea with exertion,no cough, wheezing, or hemoptysis.     Cardiovascular: No chest pain, shortness of breath or palpitation at this time.  See HPI    Gastrointestinal:  Denies change in bowel habits, dyspepsia, ulcer disease, hematochezia, or melena.     Endocrine: Negative for cold intolerance, heat intolerance, polydipsia, polyphagia and polyuria.  Hypothyroidism    Genitourinary: Negative.      Musculoskeletal: DJD    Neurological:  Denies any history of recurrent headaches, strokes, TIA, or seizure disorder.     Hematological: Denies any food allergies, seasonal allergies, bleeding disorders, or lymphadenopathy.     Psychiatric/Behavioral: Denies any history of depression, substance abuse, or change in cognitive function.         Physical Exam : The following systems reviewed and no changes noted    Constitutional: Cooperative, alert and oriented,  in no acute distress.     HENT:   Head: Normocephalic, normal hair patterns, no masses or tenderness.  Ears, Nose, and Throat: No gross abnormalities. No pallor or cyanosis.  Eyes: EOMS intact, PERRL, conjunctivae and lids unremarkable. Fundoscopic exam and visual fields not performed.   Neck: No palpable masses or adenopathy, no thyromegaly, no JVD, carotid pulses are full and equal bilaterally and without  Bruits.     Cardiovascular: Regular rhythm, S1 and S2 normal, no S3 or S4.  No murmurs, gallops, or rubs detected.     Pulmonary/Chest: Chest: normal symmetry, no tenderness to palpation, normal respiratory excursion, no intercostal retraction, no use of accessory muscles.            Pulmonary: Normal breath sounds. No rales or ronchi.    Abdominal: Abdomen soft, bowel sounds normoactive, no masses, no hepatosplenomegaly, non-tender, no bruits.     Musculoskeletal: No deformities, clubbing,  cyanosis, erythema, or edema observed.     Neurological: No gross motor or sensory deficits noted, affect appropriate, oriented to time, person, place.     Skin: Warm and dry to the touch, no apparent skin lesions or masses noted.     Psychiatric: She has a normal mood and affect. Her behavior is normal. Judgment and thought content normal.         Procedures      Lab Results   Component Value Date    WBC 6.1 09/16/2019    HGB 11.9 (L) 09/16/2019    HCT 37.1 (L) 09/16/2019    MCV 90 09/16/2019     09/16/2019     Lab Results   Component Value Date    BUN 21 01/25/2023    CREATININE 0.8 01/25/2023    CO2 31 01/25/2023    CALCIUM 9.5 01/25/2023    ALBUMIN 4.3 01/25/2023    AST 15 01/25/2023    ALT 17 01/25/2023     Lab Results   Component Value Date    CHOL 162 05/28/2021    CHOL 145 06/29/2020    CHOL 154 09/16/2019     Lab Results   Component Value Date    TRIG 110 01/25/2023    TRIG 84 06/01/2022    TRIG 65 05/28/2021     Lab Results   Component Value Date    HDL 45 01/25/2023    HDL 46 06/01/2022    HDL 48 05/28/2021     No components found for: LDLCALC  Lab Results   Component Value Date    LDL 63 01/25/2023    LDL 83 06/01/2022    LDL 91 05/28/2021     No results found for: HDLLDLRATIO  No components found for: CHOLHDL  Lab Results   Component Value Date    HGBA1C 6.0 (H) 01/25/2023     Lab Results   Component Value Date    TSH 0.83 01/25/2023           ASSESSMENT AND PLAN  Mr. Joseph has multiple medical issues including hypertension, obesity, hyperlipidemia, previous coronary artery disease with PCI to right coronary artery in 2011 at [place].  Her heart rate has improved since her previous visit.    Since her current combination of antihypertensive therapy is working for her, I have continued labetalol, olmesartan, isosorbide mononitrate, Aldactone, Lasix as described above and antiplatelet therapy with aspirin Plavix and lipid-lowering therapy with Crestor have been continued.    Her lab results  from 1/25/2023 were reviewed and CMP was within normal limits.  LDL was 63 and HDL 45.    Diagnoses and all orders for this visit:    1. Coronary artery disease involving native coronary artery of native heart without angina pectoris (Primary)  -     isosorbide mononitrate (IMDUR) 30 MG 24 hr tablet; Take 1 tablet by mouth 2 (Two) Times a Day. Take 30 mg of Imdur in the morning and 60 mg in the evening  adjust the dose depending on blood pressure.  Dispense: 180 tablet; Refill: 3    2. Essential hypertension    3. Mixed hyperlipidemia    4. Bradycardia, sinus    Other orders  -     spironolactone (ALDACTONE) 25 MG tablet; Take 1 tablet by mouth 2 (Two) Times a Day.  Dispense: 90 tablet; Refill: 2        Patient's Body mass index is 41.44 kg/m². BMI is above normal parameters. Recommendations include: exercise counseling and nutrition counseling.  Patient is a non-smoker.    Joshua Ernandez MD  7/28/2023  11:01 CDT

## 2023-08-22 DIAGNOSIS — I25.10 CORONARY ARTERY DISEASE INVOLVING NATIVE CORONARY ARTERY OF NATIVE HEART WITHOUT ANGINA PECTORIS: ICD-10-CM

## 2023-08-22 RX ORDER — ISOSORBIDE MONONITRATE 30 MG/1
30 TABLET, EXTENDED RELEASE ORAL 2 TIMES DAILY
Qty: 180 TABLET | Refills: 3
Start: 2023-08-22

## 2025-05-19 NOTE — TELEPHONE ENCOUNTER
Refill Request came in by fax from   [facility] #[id number] - [place], [place] - [address] [place] [address] - [phone]  - [phone] [address] [address]  Phone: [phone] Fax: [phone]   Refill sent to pharmacy via e-scribe.      
No

## (undated) DEVICE — SOL IRR H2O BTL 1000ML STRL

## (undated) DEVICE — GLV SURG SENSICARE POLYISPRN W/ALOE PF LF 6.5 GRN STRL

## (undated) DEVICE — SINGLE-USE BIOPSY FORCEPS: Brand: RADIAL JAW 4

## (undated) DEVICE — Device

## (undated) DEVICE — GLV SURG SENSICARE MICRO PF LF 7.5 STRL

## (undated) DEVICE — CANN SMPL SOFTECH BIFLO ETCO2 A/M 7FT